# Patient Record
Sex: MALE | Race: WHITE | NOT HISPANIC OR LATINO | Employment: OTHER | URBAN - METROPOLITAN AREA
[De-identification: names, ages, dates, MRNs, and addresses within clinical notes are randomized per-mention and may not be internally consistent; named-entity substitution may affect disease eponyms.]

---

## 2017-02-07 ENCOUNTER — OFFICE VISIT (OUTPATIENT)
Dept: CARDIOLOGY | Facility: CLINIC | Age: 77
End: 2017-02-07

## 2017-02-07 VITALS
SYSTOLIC BLOOD PRESSURE: 142 MMHG | HEART RATE: 70 BPM | DIASTOLIC BLOOD PRESSURE: 80 MMHG | WEIGHT: 182 LBS | BODY MASS INDEX: 27.58 KG/M2 | HEIGHT: 68 IN

## 2017-02-07 DIAGNOSIS — I42.8 NONISCHEMIC CARDIOMYOPATHY (HCC): ICD-10-CM

## 2017-02-07 DIAGNOSIS — I10 ESSENTIAL HYPERTENSION: ICD-10-CM

## 2017-02-07 DIAGNOSIS — I47.1 SVT (SUPRAVENTRICULAR TACHYCARDIA) (HCC): ICD-10-CM

## 2017-02-07 DIAGNOSIS — I25.10 CORONARY ARTERY DISEASE INVOLVING NATIVE CORONARY ARTERY OF NATIVE HEART WITHOUT ANGINA PECTORIS: Primary | ICD-10-CM

## 2017-02-07 DIAGNOSIS — E11.59 TYPE 2 DIABETES MELLITUS WITH OTHER CIRCULATORY COMPLICATION: ICD-10-CM

## 2017-02-07 PROCEDURE — 93000 ELECTROCARDIOGRAM COMPLETE: CPT | Performed by: INTERNAL MEDICINE

## 2017-02-07 PROCEDURE — 99214 OFFICE O/P EST MOD 30 MIN: CPT | Performed by: INTERNAL MEDICINE

## 2017-02-07 RX ORDER — ASPIRIN/CALCIUM/MAG/ALUMINUM 325 MG
325 TABLET ORAL DAILY
Qty: 360 EACH | Refills: 0 | Status: SHIPPED | OUTPATIENT
Start: 2017-02-07 | End: 2017-08-07

## 2017-02-07 RX ORDER — GLIPIZIDE 5 MG/1
5 TABLET ORAL
COMMUNITY
End: 2020-11-05 | Stop reason: DRUGHIGH

## 2017-02-07 NOTE — PROGRESS NOTES
Date of Office Visit: 2017  Encounter Provider: Lori Cruz MD  Place of Service: [facility] CARDIOLOGY  Patient Name: Gómez Franco  :1940    Chief complaint  Followup coronary artery disease and cardiomyopathy      History of Present Illness  The patient is a delightful, 76-year-old gentleman with diabetes, hypertension, and hyperlipidemia who was seen in 2015 with supraventricular tachycardia for which he initially underwent ablation, which was thought to be successful.  At that time, his ejection fraction was 35%.  However, he had recurrent palpitations and tachycardia with flutter for which he was placed on Xarelto and subsequently underwent ablation in 2015.  He then again had recurrent flutter and had a third ablation in 2015.  He subsequently was sent home and has done well and has not had any recurrent known atrial arrhythmias.  He remains on Xarelto and metoprolol.  He also in 2015 had cardiac catheterization that revealed modest coronary artery disease with 50% stenosis of the left anterior descending and a small obtuse marginal branch.  His last echocardiogram in 2015 revealed an ejection fraction of 35% and small patent foramen ovale.  He had a follow-up echocardiogram in 2016 that revealed an ejection fraction of 43% with trivial valvular regurgitation.    Since last visit he denies any chest pain, shortness of breath, palpitations, syncope.  He has unfortunately on his own been taking Xarelto alternating with aspirin every other day as he was running short on Xarelto pills.  He did not call our office for instructions.    Past Medical History   Diagnosis Date   • Atrial flutter    • Coronary artery disease    • Diabetes mellitus    • Hyperlipidemia    • Hypertension    • Supraventricular tachycardia    • Tachycardia      Past Surgical History   Procedure Laterality Date   • Atrial ablation surgery     • Cardiac ablation       atrial  flutter     Outpatient Medications Prior to Visit   Medication Sig Dispense Refill   • amLODIPine (NORVASC) 2.5 MG tablet Take 1 tablet (2.5 mg total) by mouth 2 (two) times a day. 180 tablet 3   • hydrochlorothiazide (HYDRODIURIL) 12.5 MG tablet TAKE ONE TABLET BY MOUTH DAILY 90 tablet 1   • lisinopril (PRINIVIL,ZESTRIL) 20 MG tablet TAKE 1 TABLET TWICE DAILY 180 tablet 1   • metoprolol tartrate (LOPRESSOR) 50 MG tablet Take 1 tablet by mouth 2 (two) times a day. 180 tablet 1   • simvastatin (ZOCOR) 10 MG tablet Take 1 tablet by mouth daily.     • rivaroxaban (XARELTO) 20 MG tablet Take 1 tablet by mouth daily with dinner. 90 tablet 1   • glyBURIDE (DIABETA) 5 MG tablet Take 1 tablet by mouth 2 (two) times a day before meals.       No facility-administered medications prior to visit.      Allergies as of 02/07/2017   • (No Known Allergies)     Social History     Social History   • Marital status:      Spouse name: N/A   • Number of children: N/A   • Years of education: N/A     Occupational History   • Not on file.     Social History Main Topics   • Smoking status: Never Smoker   • Smokeless tobacco: Never Used   • Alcohol use No   • Drug use: No   • Sexual activity: Not on file     Other Topics Concern   • Not on file     Social History Narrative     Family History   Problem Relation Age of Onset   • Hypertension Mother    • Diabetes Other      Unspecified Aunt     Review of Systems   Constitution: Negative for fever, malaise/fatigue, weight gain and weight loss.   HENT: Negative for ear pain, hearing loss, nosebleeds and sore throat.    Eyes: Negative for double vision, pain, vision loss in left eye and vision loss in right eye.   Cardiovascular:        See history of present illness.   Respiratory: Negative for cough, shortness of breath, sleep disturbances due to breathing, snoring and wheezing.    Endocrine: Negative for cold intolerance, heat intolerance and polyuria.   Skin: Negative for itching, poor  "wound healing and rash.   Musculoskeletal: Negative for joint pain, joint swelling and myalgias.   Gastrointestinal: Negative for abdominal pain, diarrhea, hematochezia, nausea and vomiting.   Genitourinary: Negative for hematuria and hesitancy.   Neurological: Negative for numbness, paresthesias and seizures.   Psychiatric/Behavioral: Negative for depression. The patient is not nervous/anxious.      Objective:     Vitals:    02/07/17 0918   BP: 142/80   Pulse: 70   Weight: 182 lb (82.6 kg)   Height: 68\" (172.7 cm)     Body mass index is 27.67 kg/(m^2).    Physical Exam   Constitutional: He is oriented to person, place, and time. He appears well-developed and well-nourished.   HENT:   Head: Normocephalic.   Nose: Nose normal.   Mouth/Throat: Oropharynx is clear and moist.   Eyes: Conjunctivae and EOM are normal. Pupils are equal, round, and reactive to light. Right eye exhibits no discharge. No scleral icterus.   Neck: Normal range of motion. Neck supple. No JVD present. No thyromegaly present.   Cardiovascular: Normal rate, regular rhythm, normal heart sounds and intact distal pulses.  Exam reveals no gallop and no friction rub.    No murmur heard.  Pulses:       Carotid pulses are 2+ on the right side, and 2+ on the left side.       Radial pulses are 2+ on the right side, and 2+ on the left side.        Femoral pulses are 2+ on the right side, and 2+ on the left side.       Popliteal pulses are 2+ on the right side, and 2+ on the left side.        Dorsalis pedis pulses are 2+ on the right side, and 2+ on the left side.        Posterior tibial pulses are 2+ on the right side, and 2+ on the left side.   Pulmonary/Chest: Effort normal and breath sounds normal. No respiratory distress. He has no wheezes. He has no rales.   Abdominal: Soft. Bowel sounds are normal. He exhibits no distension. There is no hepatosplenomegaly. There is no tenderness. There is no rebound.   Musculoskeletal: Normal range of motion. He " exhibits no edema or tenderness.   Neurological: He is alert and oriented to person, place, and time.   Skin: Skin is warm and dry. No rash noted. No erythema.   Psychiatric: He has a normal mood and affect. His behavior is normal. Judgment and thought content normal.   Vitals reviewed.    Lab Review:     ECG 12 Lead  Date/Time: 2/7/2017 10:55 PM  Performed by: MARTHA BANUELOS  Authorized by: MARTHA BANUELOS   Comparison: compared with previous ECG   Similar to previous ECG  Rhythm: sinus rhythm  Conduction: non-specific intraventricular conduction delay  Conduction comments: QTc =415 msec  Clinical impression: abnormal ECG          Assessment:       Diagnosis Plan   1. Coronary artery disease involving native coronary artery of native heart without angina pectoris  Adult Transthoracic Echo Complete    ECG 12 Lead   2. SVT (supraventricular tachycardia)  Adult Transthoracic Echo Complete    ECG 12 Lead   3. Essential hypertension  ECG 12 Lead   4. Nonischemic cardiomyopathy  Adult Transthoracic Echo Complete    ECG 12 Lead   5. Type 2 diabetes mellitus with other circulatory complication  ECG 12 Lead     Plan:       1.  Modest coronary artery disease, clinically stable. Resume asprin once Xarelto stopped.  We'll check a treadmill exercise test in 6 months.  2.  Cardiomyopathy, we'll check a follow-up echocardiogram.  3.  Chronic Xarelto therapy.  He has a few more tablets of Xarelto pit which she will complete and then discontinue.  4.  WPW, paroxysmal supra-ventricular tachycardia and atrial flutter, status post ablation x  3  5.  Diabetes  6.  Dyslipidemia  7.  Hypertension     Gómez Franco   Home Medication Instructions MARIANA:    Printed on:02/08/17 2007   Medication Information                      amLODIPine (NORVASC) 2.5 MG tablet  Take 1 tablet (2.5 mg total) by mouth 2 (two) times a day.             Aspirin Buf,[id number], (ASCRIPTIN) 325 MG tablet  Take 325 mg by mouth Daily.             glipiZIDE  (GLUCOTROL) 5 MG tablet  Take 5 mg by mouth 2 (Two) Times a Day Before Meals.             hydrochlorothiazide (HYDRODIURIL) 12.5 MG tablet  TAKE ONE TABLET BY MOUTH DAILY             lisinopril (PRINIVIL,ZESTRIL) 20 MG tablet  TAKE 1 TABLET TWICE DAILY             metoprolol tartrate (LOPRESSOR) 50 MG tablet  Take 1 tablet by mouth 2 (two) times a day.             simvastatin (ZOCOR) 10 MG tablet  Take 1 tablet by mouth daily.                 EMR Dragon/Transcription disclaimer:   Much of this encounter note is an electronic transcription/translation of spoken language to printed text. The electronic translation of spoken language may permit erroneous, or at times, nonsensical words or phrases to be inadvertently transcribed; Although I have reviewed the note for such errors, some may still exist.

## 2017-02-09 ENCOUNTER — HOSPITAL ENCOUNTER (OUTPATIENT)
Dept: CARDIOLOGY | Facility: HOSPITAL | Age: 77
Discharge: HOME | End: 2017-02-09
Attending: INTERNAL MEDICINE | Admitting: INTERNAL MEDICINE

## 2017-02-09 VITALS
BODY MASS INDEX: 27.58 KG/M2 | WEIGHT: 182 LBS | DIASTOLIC BLOOD PRESSURE: 76 MMHG | SYSTOLIC BLOOD PRESSURE: 128 MMHG | HEIGHT: 68 IN | HEART RATE: 72 BPM

## 2017-02-09 DIAGNOSIS — I25.10 CORONARY ARTERY DISEASE INVOLVING NATIVE CORONARY ARTERY OF NATIVE HEART WITHOUT ANGINA PECTORIS: ICD-10-CM

## 2017-02-09 DIAGNOSIS — I47.1 SVT (SUPRAVENTRICULAR TACHYCARDIA) (HCC): ICD-10-CM

## 2017-02-09 DIAGNOSIS — I42.8 NONISCHEMIC CARDIOMYOPATHY (HCC): ICD-10-CM

## 2017-02-09 LAB
ASCENDING AORTA: 3 CM
BH CV ECHO MEAS - ACS: 1.8 CM
BH CV ECHO MEAS - AO MAX PG (FULL): 3 MMHG
BH CV ECHO MEAS - AO MAX PG: 6.8 MMHG
BH CV ECHO MEAS - AO MEAN PG (FULL): 1.6 MMHG
BH CV ECHO MEAS - AO MEAN PG: 3.2 MMHG
BH CV ECHO MEAS - AO ROOT AREA (BSA CORRECTED): 1.8
BH CV ECHO MEAS - AO ROOT AREA: 9.5 CM^2
BH CV ECHO MEAS - AO ROOT DIAM: 3.5 CM
BH CV ECHO MEAS - AO V2 MAX: 130.3 CM/SEC
BH CV ECHO MEAS - AO V2 MEAN: 80.4 CM/SEC
BH CV ECHO MEAS - AO V2 VTI: 30.6 CM
BH CV ECHO MEAS - ASC AORTA: 3 CM
BH CV ECHO MEAS - AVA(I,A): 2.1 CM^2
BH CV ECHO MEAS - AVA(I,D): 2.1 CM^2
BH CV ECHO MEAS - AVA(V,A): 2.5 CM^2
BH CV ECHO MEAS - AVA(V,D): 2.5 CM^2
BH CV ECHO MEAS - BSA(HAYCOCK): 2 M^2
BH CV ECHO MEAS - BSA: 2 M^2
BH CV ECHO MEAS - BZI_BMI: 27.7 KILOGRAMS/M^2
BH CV ECHO MEAS - BZI_METRIC_HEIGHT: 172.7 CM
BH CV ECHO MEAS - BZI_METRIC_WEIGHT: 82.6 KG
BH CV ECHO MEAS - CONTRAST EF (2CH): 65.1 ML/M^2
BH CV ECHO MEAS - CONTRAST EF 4CH: 63.6 ML/M^2
BH CV ECHO MEAS - EDV(MOD-SP2): 86 ML
BH CV ECHO MEAS - EDV(MOD-SP4): 88 ML
BH CV ECHO MEAS - EDV(TEICH): 147 ML
BH CV ECHO MEAS - EF(CUBED): 86.1 %
BH CV ECHO MEAS - EF(MOD-SP2): 65.1 %
BH CV ECHO MEAS - EF(MOD-SP4): 63.6 %
BH CV ECHO MEAS - EF(TEICH): 79.1 %
BH CV ECHO MEAS - ESV(MOD-SP2): 30 ML
BH CV ECHO MEAS - ESV(MOD-SP4): 32 ML
BH CV ECHO MEAS - ESV(TEICH): 30.7 ML
BH CV ECHO MEAS - FS: 48.3 %
BH CV ECHO MEAS - IVS/LVPW: 1
BH CV ECHO MEAS - IVSD: 1.1 CM
BH CV ECHO MEAS - LAT PEAK E' VEL: 8 CM/SEC
BH CV ECHO MEAS - LV DIASTOLIC VOL/BSA (35-75): 44.8 ML/M^2
BH CV ECHO MEAS - LV MASS(C)D: 234.3 GRAMS
BH CV ECHO MEAS - LV MASS(C)DI: 119.3 GRAMS/M^2
BH CV ECHO MEAS - LV MAX PG: 3.8 MMHG
BH CV ECHO MEAS - LV MEAN PG: 1.6 MMHG
BH CV ECHO MEAS - LV SYSTOLIC VOL/BSA (12-30): 16.3 ML/M^2
BH CV ECHO MEAS - LV V1 MAX: 97.9 CM/SEC
BH CV ECHO MEAS - LV V1 MEAN: 56.1 CM/SEC
BH CV ECHO MEAS - LV V1 VTI: 19.5 CM
BH CV ECHO MEAS - LVIDD: 5.5 CM
BH CV ECHO MEAS - LVIDS: 2.8 CM
BH CV ECHO MEAS - LVLD AP2: 7.4 CM
BH CV ECHO MEAS - LVLD AP4: 7.5 CM
BH CV ECHO MEAS - LVLS AP2: 6.4 CM
BH CV ECHO MEAS - LVLS AP4: 6.3 CM
BH CV ECHO MEAS - LVOT AREA (M): 3.5 CM^2
BH CV ECHO MEAS - LVOT AREA: 3.3 CM^2
BH CV ECHO MEAS - LVOT DIAM: 2.1 CM
BH CV ECHO MEAS - LVPWD: 1.1 CM
BH CV ECHO MEAS - MED PEAK E' VEL: 8 CM/SEC
BH CV ECHO MEAS - MR MAX PG: 74.9 MMHG
BH CV ECHO MEAS - MR MAX VEL: 432.7 CM/SEC
BH CV ECHO MEAS - MV A DUR: 0.11 SEC
BH CV ECHO MEAS - MV A MAX VEL: 85.4 CM/SEC
BH CV ECHO MEAS - MV DEC SLOPE: 298.9 CM/SEC^2
BH CV ECHO MEAS - MV DEC TIME: 0.24 SEC
BH CV ECHO MEAS - MV E MAX VEL: 66 CM/SEC
BH CV ECHO MEAS - MV E/A: 0.77
BH CV ECHO MEAS - MV P1/2T MAX VEL: 65 CM/SEC
BH CV ECHO MEAS - MV P1/2T: 63.7 MSEC
BH CV ECHO MEAS - MVA P1/2T LCG: 3.4 CM^2
BH CV ECHO MEAS - MVA(P1/2T): 3.5 CM^2
BH CV ECHO MEAS - PA MAX PG (FULL): 1.6 MMHG
BH CV ECHO MEAS - PA MAX PG: 3.7 MMHG
BH CV ECHO MEAS - PA V2 MAX: 96.4 CM/SEC
BH CV ECHO MEAS - PULM A REVS DUR: 0.08 SEC
BH CV ECHO MEAS - PULM A REVS VEL: 27.2 CM/SEC
BH CV ECHO MEAS - PULM DIAS VEL: 41.7 CM/SEC
BH CV ECHO MEAS - PULM S/D: 1.5
BH CV ECHO MEAS - PULM SYS VEL: 60.6 CM/SEC
BH CV ECHO MEAS - PVA(V,A): 2.2 CM^2
BH CV ECHO MEAS - PVA(V,D): 2.2 CM^2
BH CV ECHO MEAS - QP/QS: 0.7
BH CV ECHO MEAS - RAP SYSTOLE: 3 MMHG
BH CV ECHO MEAS - RV MAX PG: 2.1 MMHG
BH CV ECHO MEAS - RV MEAN PG: 1.1 MMHG
BH CV ECHO MEAS - RV V1 MAX: 73.2 CM/SEC
BH CV ECHO MEAS - RV V1 MEAN: 49.3 CM/SEC
BH CV ECHO MEAS - RV V1 VTI: 15.8 CM
BH CV ECHO MEAS - RVOT AREA: 2.9 CM^2
BH CV ECHO MEAS - RVOT DIAM: 1.9 CM
BH CV ECHO MEAS - SI(AO): 148.1 ML/M^2
BH CV ECHO MEAS - SI(CUBED): 72.7 ML/M^2
BH CV ECHO MEAS - SI(LVOT): 33.1 ML/M^2
BH CV ECHO MEAS - SI(MOD-SP2): 28.5 ML/M^2
BH CV ECHO MEAS - SI(MOD-SP4): 28.5 ML/M^2
BH CV ECHO MEAS - SI(TEICH): 59.2 ML/M^2
BH CV ECHO MEAS - SUP REN AO DIAM: 1.9 CM
BH CV ECHO MEAS - SV(AO): 290.8 ML
BH CV ECHO MEAS - SV(CUBED): 142.8 ML
BH CV ECHO MEAS - SV(LVOT): 65 ML
BH CV ECHO MEAS - SV(MOD-SP2): 56 ML
BH CV ECHO MEAS - SV(MOD-SP4): 56 ML
BH CV ECHO MEAS - SV(RVOT): 45.5 ML
BH CV ECHO MEAS - SV(TEICH): 116.3 ML
BH CV ECHO MEAS - TAPSE (>1.6): 2.4 CM2
BH CV XLRA - RV BASE: 2.9 CM
BH CV XLRA - TDI S': 12 CM/SEC
E/E' RATIO: 8
LEFT ATRIUM VOLUME INDEX: 19 ML/M2
LV EF 2D ECHO EST: 64 %
SINUS: 3.4 CM
STJ: 2.9 CM

## 2017-02-09 PROCEDURE — 93306 TTE W/DOPPLER COMPLETE: CPT

## 2017-02-09 PROCEDURE — 93306 TTE W/DOPPLER COMPLETE: CPT | Performed by: INTERNAL MEDICINE

## 2017-02-13 ENCOUNTER — TELEPHONE (OUTPATIENT)
Dept: CARDIOLOGY | Facility: CLINIC | Age: 77
End: 2017-02-13

## 2017-02-17 RX ORDER — LISINOPRIL 20 MG/1
20 TABLET ORAL 2 TIMES DAILY
Qty: 180 TABLET | Refills: 1 | Status: SHIPPED | OUTPATIENT
Start: 2017-02-17 | End: 2017-08-25 | Stop reason: SDUPTHER

## 2017-02-17 RX ORDER — LISINOPRIL 20 MG/1
20 TABLET ORAL 2 TIMES DAILY
Qty: 180 TABLET | Refills: 1 | Status: SHIPPED | OUTPATIENT
Start: 2017-02-17 | End: 2017-02-17 | Stop reason: SDUPTHER

## 2017-02-17 RX ORDER — LISINOPRIL 20 MG/1
20 TABLET ORAL 2 TIMES DAILY
Qty: 60 TABLET | Refills: 1 | Status: SHIPPED | OUTPATIENT
Start: 2017-02-17 | End: 2017-02-17 | Stop reason: SDUPTHER

## 2017-02-21 RX ORDER — METOPROLOL TARTRATE 50 MG/1
50 TABLET, FILM COATED ORAL 2 TIMES DAILY
Qty: 180 TABLET | Refills: 1 | Status: SHIPPED | OUTPATIENT
Start: 2017-02-21 | End: 2017-02-24 | Stop reason: SDUPTHER

## 2017-02-21 RX ORDER — AMLODIPINE BESYLATE 2.5 MG/1
2.5 TABLET ORAL 2 TIMES DAILY
Qty: 180 TABLET | Refills: 1 | Status: SHIPPED | OUTPATIENT
Start: 2017-02-21 | End: 2017-02-24 | Stop reason: SDUPTHER

## 2017-02-21 RX ORDER — HYDROCHLOROTHIAZIDE 12.5 MG/1
12.5 TABLET ORAL DAILY
Qty: 90 TABLET | Refills: 1 | Status: SHIPPED | OUTPATIENT
Start: 2017-02-21 | End: 2017-02-24 | Stop reason: SDUPTHER

## 2017-02-24 RX ORDER — METOPROLOL TARTRATE 50 MG/1
50 TABLET, FILM COATED ORAL 2 TIMES DAILY
Qty: 180 TABLET | Refills: 1 | Status: SHIPPED | OUTPATIENT
Start: 2017-02-24 | End: 2017-08-25 | Stop reason: SDUPTHER

## 2017-02-24 RX ORDER — HYDROCHLOROTHIAZIDE 12.5 MG/1
12.5 TABLET ORAL DAILY
Qty: 90 TABLET | Refills: 1 | Status: SHIPPED | OUTPATIENT
Start: 2017-02-24 | End: 2017-08-25 | Stop reason: SDUPTHER

## 2017-02-24 RX ORDER — AMLODIPINE BESYLATE 2.5 MG/1
2.5 TABLET ORAL 2 TIMES DAILY
Qty: 180 TABLET | Refills: 1 | Status: SHIPPED | OUTPATIENT
Start: 2017-02-24 | End: 2017-08-25 | Stop reason: SDUPTHER

## 2017-05-30 RX ORDER — LISINOPRIL 20 MG/1
TABLET ORAL
Qty: 60 TABLET | Refills: 3 | Status: SHIPPED | OUTPATIENT
Start: 2017-05-30 | End: 2017-08-07 | Stop reason: SDUPTHER

## 2017-06-15 ENCOUNTER — OFFICE VISIT (OUTPATIENT)
Dept: ORTHOPEDIC SURGERY | Facility: CLINIC | Age: 77
End: 2017-06-15

## 2017-06-15 VITALS
HEIGHT: 68 IN | HEART RATE: 67 BPM | WEIGHT: 165 LBS | BODY MASS INDEX: 25.01 KG/M2 | SYSTOLIC BLOOD PRESSURE: 173 MMHG | DIASTOLIC BLOOD PRESSURE: 91 MMHG

## 2017-06-15 DIAGNOSIS — M67.912 TENDINOPATHY OF ROTATOR CUFF, LEFT: ICD-10-CM

## 2017-06-15 DIAGNOSIS — M19.019 AC JOINT ARTHROPATHY: ICD-10-CM

## 2017-06-15 DIAGNOSIS — R52 PAIN: Primary | ICD-10-CM

## 2017-06-15 PROBLEM — M67.919 TENDINOPATHY OF ROTATOR CUFF: Status: ACTIVE | Noted: 2017-06-15

## 2017-06-15 PROCEDURE — 20610 DRAIN/INJ JOINT/BURSA W/O US: CPT | Performed by: NURSE PRACTITIONER

## 2017-06-15 PROCEDURE — 99203 OFFICE O/P NEW LOW 30 MIN: CPT | Performed by: NURSE PRACTITIONER

## 2017-06-15 PROCEDURE — 73030 X-RAY EXAM OF SHOULDER: CPT | Performed by: NURSE PRACTITIONER

## 2017-06-15 RX ORDER — LIDOCAINE HYDROCHLORIDE 10 MG/ML
2 INJECTION, SOLUTION EPIDURAL; INFILTRATION; INTRACAUDAL; PERINEURAL
Status: COMPLETED | OUTPATIENT
Start: 2017-06-15 | End: 2017-06-15

## 2017-06-15 RX ORDER — ASPIRIN 81 MG/1
81 TABLET ORAL DAILY
COMMUNITY
End: 2019-02-12

## 2017-06-15 RX ORDER — BETAMETHASONE SODIUM PHOSPHATE AND BETAMETHASONE ACETATE 3; 3 MG/ML; MG/ML
12 INJECTION, SUSPENSION INTRA-ARTICULAR; INTRALESIONAL; INTRAMUSCULAR; SOFT TISSUE
Status: COMPLETED | OUTPATIENT
Start: 2017-06-15 | End: 2017-06-15

## 2017-06-15 RX ORDER — BUPIVACAINE HYDROCHLORIDE 5 MG/ML
2 INJECTION, SOLUTION EPIDURAL; INTRACAUDAL
Status: COMPLETED | OUTPATIENT
Start: 2017-06-15 | End: 2017-06-15

## 2017-06-15 RX ADMIN — LIDOCAINE HYDROCHLORIDE 2 ML: 10 INJECTION, SOLUTION EPIDURAL; INFILTRATION; INTRACAUDAL; PERINEURAL at 09:20

## 2017-06-15 RX ADMIN — BUPIVACAINE HYDROCHLORIDE 2 ML: 5 INJECTION, SOLUTION EPIDURAL; INTRACAUDAL at 09:20

## 2017-06-15 RX ADMIN — BETAMETHASONE SODIUM PHOSPHATE AND BETAMETHASONE ACETATE 12 MG: 3; 3 INJECTION, SUSPENSION INTRA-ARTICULAR; INTRALESIONAL; INTRAMUSCULAR; SOFT TISSUE at 09:20

## 2017-06-15 NOTE — PROGRESS NOTES
Subjective:     Patient ID: Gómez Franco is a 76 y.o. male.    Chief Complaint: Left shoulder pain, one month after injury    History of Present Illness    Mr. Franco presents with a reported one month history of acute onset left shoulder pain. Began noticing pain after falling, backward, off a short stool. He reached back to catch himself and afterward noticed increased pain lateral aspect left shoulder. Reports left upper extremity feeling weak when in dependent position. Increased pain when reaching backward and is unable to dress nor reaching back to get wallet out of pocket due to severe pain. Rates pain at 8-9 out of 10, aching and pulling in nature. Denies radiating pain. Denies previous injury, surgery, corticosteroid injections, imaging and all other treatments at left shoulder. Denies presence of numbness or tingling. He has been avoiding activities that increase pain which has helped. He does take daily  mg therefore has not been taking any other medications for symptom relief. Denies all other concerns present at this time.      Social History     Occupational History   • Not on file.     Social History Main Topics   • Smoking status: Never Smoker   • Smokeless tobacco: Never Used   • Alcohol use No   • Drug use: No   • Sexual activity: Not on file      Past Medical History:   Diagnosis Date   • Atrial flutter    • Coronary artery disease    • Diabetes mellitus    • Hyperlipidemia    • Hypertension    • Supraventricular tachycardia    • Tachycardia      Past Surgical History:   Procedure Laterality Date   • ATRIAL ABLATION SURGERY     • CARDIAC ABLATION      atrial flutter       Family History   Problem Relation Age of Onset   • Hypertension Mother    • Diabetes Other      Unspecified Aunt         Review of Systems   Constitutional: Negative for chills, diaphoresis, fever and unexpected weight change.   HENT: Negative for hearing loss, nosebleeds, sore throat and tinnitus.    Eyes:  "Negative for pain and visual disturbance.   Respiratory: Negative for cough, shortness of breath and wheezing.    Cardiovascular: Negative for chest pain and palpitations.   Gastrointestinal: Negative for abdominal pain, diarrhea, nausea and vomiting.   Endocrine: Negative for cold intolerance, heat intolerance and polydipsia.   Genitourinary: Negative for difficulty urinating, dysuria and hematuria.   Musculoskeletal: Positive for arthralgias. Negative for joint swelling and myalgias.   Skin: Negative for rash and wound.   Allergic/Immunologic: Negative for environmental allergies.   Neurological: Negative for dizziness, syncope and numbness.   Hematological: Does not bruise/bleed easily.   Psychiatric/Behavioral: Negative for dysphoric mood and sleep disturbance. The patient is not nervous/anxious.            Objective:  Physical Exam    Vital signs reviewed.   General: No acute distress.  Eyes: conjunctiva clear; pupils equally round and reactive  ENT: external ears and nose atraumatic; oropharynx clear  CV: no peripheral edema  Resp: normal respiratory effort  Skin: no rashes or wounds; normal turgor  Psych: mood and affect appropriate; recent and remote memory intact    Vitals:    06/15/17 0844   BP: 173/91   Pulse: 67   Weight: 165 lb (74.8 kg)   Height: 68\" (172.7 cm)     Last 2 weights    06/15/17  0844   Weight: 165 lb (74.8 kg)     Body mass index is 25.09 kg/(m^2).     Left Shoulder Exam     Tenderness   The patient is experiencing tenderness in the acromion.    Range of Motion   Forward Flexion: 180+   External Rotation: 70     Muscle Strength   Internal Rotation: 4/5   External Rotation: 4/5   Supraspinatus: 4/5   Subscapularis: 4/5   Biceps: 4/5     Tests   Apprehension: positive  Cross Arm: positive  Drop Arm: negative  Hawkin's test: negative  Impingement: negative  Sulcus: absent    Other   Erythema: absent  Scars: absent  Sensation: normal  Pulse: present     Comments:  Positive bear hug  Negative " empty can  Negative [place]'s  Positive belly press  Negative Speed's   Internal rotation to side          Imaging:  Left Shoulder X-Ray  Indication: Pain  AP Internal and External Rotation views    Findings:  No fracture  No bony lesion  Normal soft tissues  AC joint arthropathy     No prior studies were available for comparison.    Assessment:       1. Pain    2. Tendinopathy of rotator cuff, left    3. AC joint arthropathy          Plan:  1. Discussed plan of care with patient. Wishes to proceed with corticosteroid injection left shoulder.   2. Provided him with home strengthening activities to complete along with stretching activities. Refuses referral to physical therapy at this time. Will plan to see him back in four weeks to reassess.   BEULAH query complete.  Large Joint Arthrocentesis  Date/Time: 6/15/2017 9:20 AM  Consent given by: patient  Site marked: site marked  Supporting Documentation  Indications: pain   Procedure Details  Location: shoulder - L subacromial bursa  Preparation: Patient was prepped and draped in the usual sterile fashion  Needle size: 22 G  Medications administered: 2 mL lidocaine PF 1% 1 %; 2 mL bupivacaine (PF) 0.5 %; 12 mg betamethasone acetate-betamethasone sodium phosphate 6 (3-3) MG/ML  Patient tolerance: patient tolerated the procedure well with no immediate complications

## 2017-07-12 ENCOUNTER — OFFICE VISIT (OUTPATIENT)
Dept: ORTHOPEDIC SURGERY | Facility: CLINIC | Age: 77
End: 2017-07-12

## 2017-07-12 DIAGNOSIS — M67.912 TENDINOPATHY OF ROTATOR CUFF, LEFT: Primary | ICD-10-CM

## 2017-07-12 DIAGNOSIS — M19.019 AC JOINT ARTHROPATHY: ICD-10-CM

## 2017-07-12 PROCEDURE — 99212 OFFICE O/P EST SF 10 MIN: CPT | Performed by: NURSE PRACTITIONER

## 2017-07-12 NOTE — PROGRESS NOTES
Subjective:     Patient ID: Gómez Franco is a 76 y.o. male.    Chief Complaint: follow-up rotator cuff tendinopathy, left shoulder pain    History of Present Illness    Mr. Franco presents for four week follow-up left shoulder pain. Reports 90% symptom relief with use and 100% relief of symptoms at rest. He occasionally experiences pain with reaching back and out to side however is very pleased with the relief he has received. Denies all other concerns present at this time.      Social History     Occupational History   • Not on file.     Social History Main Topics   • Smoking status: Never Smoker   • Smokeless tobacco: Never Used   • Alcohol use No   • Drug use: No   • Sexual activity: Not on file      Past Medical History:   Diagnosis Date   • Atrial flutter    • Coronary artery disease    • Diabetes mellitus    • Hyperlipidemia    • Hypertension    • Supraventricular tachycardia    • Tachycardia      Past Surgical History:   Procedure Laterality Date   • ATRIAL ABLATION SURGERY     • CARDIAC ABLATION      atrial flutter       Family History   Problem Relation Age of Onset   • Hypertension Mother    • Diabetes Other      Unspecified Aunt         Review of Systems   Constitutional: Negative for chills, diaphoresis, fever and unexpected weight change.   HENT: Negative for hearing loss, nosebleeds, sore throat and tinnitus.    Eyes: Negative for pain and visual disturbance.   Respiratory: Negative for cough, shortness of breath and wheezing.    Cardiovascular: Negative for chest pain and palpitations.   Gastrointestinal: Negative for abdominal pain, diarrhea, nausea and vomiting.   Endocrine: Negative for cold intolerance, heat intolerance and polydipsia.   Genitourinary: Negative for difficulty urinating, dysuria and hematuria.   Musculoskeletal: Positive for arthralgias. Negative for joint swelling and myalgias.   Skin: Negative for rash and wound.   Allergic/Immunologic: Negative for environmental  allergies.   Neurological: Negative for dizziness, syncope and numbness.   Hematological: Does not bruise/bleed easily.   Psychiatric/Behavioral: Negative for dysphoric mood and sleep disturbance. The patient is not nervous/anxious.    All other systems reviewed and are negative.          Objective:  Physical Exam    General: No acute distress.  Eyes: conjunctiva clear; pupils equally round and reactive  ENT: external ears and nose atraumatic; oropharynx clear  CV: no peripheral edema  Resp: normal respiratory effort  Skin: no rashes or wounds; normal turgor  Psych: mood and affect appropriate; recent and remote memory intact    There were no vitals filed for this visit.  There were no vitals filed for this visit.  There is no height or weight on file to calculate BMI.     Ortho Exam      Left Shoulder Exam      Tenderness   The patient is experiencing tenderness in the acromion.     Range of Motion   Forward Flexion: 180+   External Rotation: 70      Muscle Strength   Internal Rotation: 4/5   External Rotation: 4/5   Supraspinatus: 4/5   Subscapularis: 4/5   Biceps: 4/5      Tests   Apprehension: positive  Cross Arm: positive  Drop Arm: negative  Hawkin's test: negative  Impingement: negative  Sulcus: absent     Other   Erythema: absent  Scars: absent  Sensation: normal  Pulse: present      Comments:   Positive bear hug  Negative empty can  Negative [place]'s  Positive belly press  Negative Speed's   Internal rotation to side    Assessment:       1. Tendinopathy of rotator cuff, left    2. AC joint arthropathy          Plan:  1. Discussed plan of care with patient. Will plan to follow-up in 3-4 months as needed. Patient encouraged to continue with home strengthening activities that were previously discussed at his first visit. Patient verbalized understanding of all information and agrees with plan of care. Denies all other concerns present at this time.

## 2017-08-07 ENCOUNTER — OFFICE VISIT (OUTPATIENT)
Dept: CARDIOLOGY | Facility: CLINIC | Age: 77
End: 2017-08-07

## 2017-08-07 VITALS
DIASTOLIC BLOOD PRESSURE: 80 MMHG | WEIGHT: 171 LBS | BODY MASS INDEX: 25.91 KG/M2 | HEART RATE: 63 BPM | HEIGHT: 68 IN | SYSTOLIC BLOOD PRESSURE: 122 MMHG

## 2017-08-07 DIAGNOSIS — I25.10 CORONARY ARTERY DISEASE INVOLVING NATIVE CORONARY ARTERY OF NATIVE HEART WITHOUT ANGINA PECTORIS: Primary | ICD-10-CM

## 2017-08-07 DIAGNOSIS — I42.8 NONISCHEMIC CARDIOMYOPATHY (HCC): ICD-10-CM

## 2017-08-07 DIAGNOSIS — E11.59 TYPE 2 DIABETES MELLITUS WITH OTHER CIRCULATORY COMPLICATION: ICD-10-CM

## 2017-08-07 DIAGNOSIS — I10 ESSENTIAL HYPERTENSION: ICD-10-CM

## 2017-08-07 DIAGNOSIS — I47.1 SVT (SUPRAVENTRICULAR TACHYCARDIA) (HCC): ICD-10-CM

## 2017-08-07 PROCEDURE — 99213 OFFICE O/P EST LOW 20 MIN: CPT | Performed by: INTERNAL MEDICINE

## 2017-08-07 PROCEDURE — 93000 ELECTROCARDIOGRAM COMPLETE: CPT | Performed by: INTERNAL MEDICINE

## 2017-08-07 NOTE — PROGRESS NOTES
Date of Office Visit: 2017  Encounter Provider: Lori Cruz MD  Place of Service: [facility] CARDIOLOGY  Patient Name: Gómez Franco  :1940    Chief complaint  Followup coronary artery disease and transient cardiomyopathy    History of Present Illness  The patient is a delightful, 76-year-old gentleman with diabetes, hypertension, and hyperlipidemia who was seen in 2015 with supraventricular tachycardia for which he initially underwent ablation, which was thought to be successful.  At that time, his ejection fraction was 35%.  However, he had recurrent palpitations and tachycardia with flutter for which he was placed on Xarelto and subsequently underwent ablation in 2015.  He then again had recurrent flutter and had a third ablation in 2015.  He subsequently was sent home and has done well and has not had any recurrent known atrial arrhythmias.  He remains on Xarelto and metoprolol.  He also in 2015 had cardiac catheterization that revealed modest coronary artery disease with 50% stenosis of the left anterior descending and a small obtuse marginal branch.  His last echocardiogram in 2015 revealed an ejection fraction of 35% and small patent foramen ovale.  He had a follow-up echocardiogram on 2017 that showed normal systolic function with an ejection fraction of 64%.  No significant valvular heart disease was present.    Since last visit he has remained very active he denies any chest pain, shortness of breath, palpitations, syncope near syncope.  His blood pressures at home and checked early in the morning have been in the 150s over 89 range.  He has not been checking it during the day though he remains very active.  He admits to having significant dietary discretion's with sugar in take and his hemoglobin A1c had almost doubled.  He has made significant lifestyle changes since then and is to have additional blood work in November with   [facility].    Past Medical History:   Diagnosis Date   • Atrial flutter    • Chest pain    • Coronary artery disease    • Diabetes mellitus    • Fatigue    • Hyperlipidemia    • Hypertension    • Lightheadedness    • SOB (shortness of breath)    • Supraventricular tachycardia    • Tachycardia      Past Surgical History:   Procedure Laterality Date   • ATRIAL ABLATION SURGERY     • CARDIAC ABLATION      atrial flutter     Outpatient Medications Prior to Visit   Medication Sig Dispense Refill   • amLODIPine (NORVASC) 2.5 MG tablet Take 1 tablet by mouth 2 (Two) Times a Day. 180 tablet 1   • aspirin 81 MG EC tablet Take 81 mg by mouth Daily.     • glipiZIDE (GLUCOTROL) 5 MG tablet Take 5 mg by mouth 2 (Two) Times a Day Before Meals.     • hydrochlorothiazide (HYDRODIURIL) 12.5 MG tablet Take 1 tablet by mouth Daily. 90 tablet 1   • lisinopril (PRINIVIL,ZESTRIL) 20 MG tablet Take 1 tablet by mouth 2 (Two) Times a Day. 180 tablet 1   • metoprolol tartrate (LOPRESSOR) 50 MG tablet Take 1 tablet by mouth 2 (Two) Times a Day. 180 tablet 1   • simvastatin (ZOCOR) 10 MG tablet Take 1 tablet by mouth daily.     • Aspirin Buf,[id number]-[id number], (ASCRIPTIN) 325 MG tablet Take 325 mg by mouth Daily. 360 each 0   • Diclofenac Sodium (PENNSAID) 2 % solution Place 2 g on the skin 2 (Two) Times a Day. 112 g 3   • lisinopril (PRINIVIL,ZESTRIL) 20 MG tablet TAKE ONE TABLET BY MOUTH TWICE A DAY 60 tablet 3     No facility-administered medications prior to visit.      Allergies as of 08/07/2017   • (No Known Allergies)     Social History     Social History   • Marital status:      Spouse name: N/A   • Number of children: N/A   • Years of education: N/A     Occupational History   • Not on file.     Social History Main Topics   • Smoking status: Never Smoker   • Smokeless tobacco: Never Used   • Alcohol use No   • Drug use: No   • Sexual activity: Not on file     Other Topics Concern   • Not on file     Social History Narrative  "    Family History   Problem Relation Age of Onset   • Hypertension Mother    • Diabetes Other      Unspecified Aunt     Review of Systems   Constitution: Negative for fever, malaise/fatigue, weight gain and weight loss.   HENT: Negative for ear pain, hearing loss, nosebleeds and sore throat.    Eyes: Negative for double vision, pain, vision loss in left eye and vision loss in right eye.   Cardiovascular:        See history of present illness.   Respiratory: Negative for cough, shortness of breath, sleep disturbances due to breathing, snoring and wheezing.    Endocrine: Negative for cold intolerance, heat intolerance and polyuria.   Skin: Negative for itching, poor wound healing and rash.   Musculoskeletal: Positive for myalgias. Negative for joint pain and joint swelling.        Pulled biceps   Gastrointestinal: Negative for abdominal pain, diarrhea, hematochezia, nausea and vomiting.   Genitourinary: Negative for hematuria and hesitancy.   Neurological: Negative for numbness, paresthesias and seizures.   Psychiatric/Behavioral: Negative for depression. The patient is not nervous/anxious.      Objective:     Vitals:    08/07/17 1145   BP: 122/80   Pulse: 63   Weight: 171 lb (77.6 kg)   Height: 68\" (172.7 cm)     Body mass index is 26 kg/(m^2).    Physical Exam   Constitutional: He is oriented to person, place, and time. He appears well-developed and well-nourished.   HENT:   Head: Normocephalic.   Nose: Nose normal.   Mouth/Throat: Oropharynx is clear and moist.   Eyes: Conjunctivae and EOM are normal. Pupils are equal, round, and reactive to light. Right eye exhibits no discharge. No scleral icterus.   Neck: Normal range of motion. Neck supple. No JVD present. No thyromegaly present.   Cardiovascular: Normal rate, regular rhythm, normal heart sounds and intact distal pulses.  Exam reveals no gallop and no friction rub.    No murmur heard.  Pulses:       Carotid pulses are 2+ on the right side, and 2+ on the left " side.       Radial pulses are 2+ on the right side, and 2+ on the left side.        Femoral pulses are 2+ on the right side, and 2+ on the left side.       Popliteal pulses are 2+ on the right side, and 2+ on the left side.        Dorsalis pedis pulses are 2+ on the right side, and 2+ on the left side.        Posterior tibial pulses are 2+ on the right side, and 2+ on the left side.   Pulmonary/Chest: Effort normal and breath sounds normal. No respiratory distress. He has no wheezes. He has no rales.   Abdominal: Soft. Bowel sounds are normal. He exhibits no distension. There is no hepatosplenomegaly. There is no tenderness. There is no rebound.   Musculoskeletal: Normal range of motion. He exhibits no edema or tenderness.   Neurological: He is alert and oriented to person, place, and time.   Skin: Skin is warm and dry. No rash noted. No erythema.   Psychiatric: He has a normal mood and affect. His behavior is normal. Judgment and thought content normal.   Vitals reviewed.    Lab Review:     ECG 12 Lead  Date/Time: 8/7/2017 7:46 AM  Performed by: MARTHA BANUELOS  Authorized by: MARTHA BANUELOS   Comparison: compared with previous ECG   Similar to previous ECG  Rhythm: sinus rhythm  Conduction: non-specific intraventricular conduction delay  Conduction comments: QTc =418 msec  Clinical impression: abnormal ECG          Assessment:       Diagnosis Plan   1. Coronary artery disease involving native coronary artery of native heart without angina pectoris     2. Essential hypertension     3. Nonischemic cardiomyopathy     4. SVT (supraventricular tachycardia)     5. Type 2 diabetes mellitus with other circulatory complication       Plan:       1.  Modest coronary artery disease, clinically stable.  Clinically doing well.  Needs further risk factor modification with diabetic control and possibly with additional pressure control.  2.  Hypertension.  He will call with additional readings over the next week checked later in the day  and in the evening.  3.  Transient cardiomyopathy.  Likely tachycardia mediated.  Resolved  4.  WPW, paroxysmal supra-ventricular tachycardia and atrial flutter, status post ablation x  3  5.  Diabetes  6.  Dyslipidemia  7.  Hypertension    Coronary Artery Disease  Assessment  • The patient has no angina    Plan  • Lifestyle modifications discussed include adhering to a heart healthy diet, maintenance of a healthy weight, regular exercise and regular monitoring of cholesterol and blood pressure    Subjective - Objective  • Current antiplatelet therapy includes aspirin 81 mg           Gómez Franco   Home Medication Instructions MARIANA:    Printed on:08/11/17 7143   Medication Information                      amLODIPine (NORVASC) 2.5 MG tablet  Take 1 tablet by mouth 2 (Two) Times a Day.             aspirin 81 MG EC tablet  Take 81 mg by mouth Daily.             glipiZIDE (GLUCOTROL) 5 MG tablet  Take 5 mg by mouth 2 (Two) Times a Day Before Meals.             hydrochlorothiazide (HYDRODIURIL) 12.5 MG tablet  Take 1 tablet by mouth Daily.             lisinopril (PRINIVIL,ZESTRIL) 20 MG tablet  Take 1 tablet by mouth 2 (Two) Times a Day.             metoprolol tartrate (LOPRESSOR) 50 MG tablet  Take 1 tablet by mouth 2 (Two) Times a Day.             simvastatin (ZOCOR) 10 MG tablet  Take 1 tablet by mouth daily.               Dictated utilizing Dragon dictation

## 2017-08-25 RX ORDER — METOPROLOL TARTRATE 50 MG/1
50 TABLET, FILM COATED ORAL 2 TIMES DAILY
Qty: 180 TABLET | Refills: 1 | Status: SHIPPED | OUTPATIENT
Start: 2017-08-25 | End: 2018-02-28 | Stop reason: SDUPTHER

## 2017-08-25 RX ORDER — AMLODIPINE BESYLATE 2.5 MG/1
2.5 TABLET ORAL 2 TIMES DAILY
Qty: 180 TABLET | Refills: 1 | Status: SHIPPED | OUTPATIENT
Start: 2017-08-25 | End: 2018-02-28 | Stop reason: SDUPTHER

## 2017-08-25 RX ORDER — HYDROCHLOROTHIAZIDE 12.5 MG/1
12.5 TABLET ORAL DAILY
Qty: 90 TABLET | Refills: 1 | Status: SHIPPED | OUTPATIENT
Start: 2017-08-25 | End: 2018-08-07

## 2017-08-25 RX ORDER — LISINOPRIL 20 MG/1
20 TABLET ORAL 2 TIMES DAILY
Qty: 180 TABLET | Refills: 1 | Status: SHIPPED | OUTPATIENT
Start: 2017-08-25 | End: 2018-02-28 | Stop reason: SDUPTHER

## 2017-11-27 RX ORDER — HYDROCHLOROTHIAZIDE 12.5 MG/1
TABLET ORAL
Qty: 90 TABLET | Refills: 3 | Status: SHIPPED | OUTPATIENT
Start: 2017-11-27 | End: 2018-08-07

## 2018-02-28 RX ORDER — AMLODIPINE BESYLATE 2.5 MG/1
2.5 TABLET ORAL 2 TIMES DAILY
Qty: 180 TABLET | Refills: 1 | Status: SHIPPED | OUTPATIENT
Start: 2018-02-28 | End: 2018-08-27 | Stop reason: SDUPTHER

## 2018-02-28 RX ORDER — LISINOPRIL 20 MG/1
20 TABLET ORAL 2 TIMES DAILY
Qty: 180 TABLET | Refills: 1 | Status: SHIPPED | OUTPATIENT
Start: 2018-02-28 | End: 2018-08-27 | Stop reason: SDUPTHER

## 2018-02-28 RX ORDER — METOPROLOL TARTRATE 50 MG/1
50 TABLET, FILM COATED ORAL EVERY 12 HOURS SCHEDULED
Qty: 180 TABLET | Refills: 1 | Status: SHIPPED | OUTPATIENT
Start: 2018-02-28 | End: 2018-02-28 | Stop reason: SDUPTHER

## 2018-02-28 RX ORDER — AMLODIPINE BESYLATE 2.5 MG/1
2.5 TABLET ORAL 2 TIMES DAILY
Qty: 180 TABLET | Refills: 1 | Status: SHIPPED | OUTPATIENT
Start: 2018-02-28 | End: 2018-02-28 | Stop reason: SDUPTHER

## 2018-02-28 RX ORDER — METOPROLOL TARTRATE 50 MG/1
50 TABLET, FILM COATED ORAL EVERY 12 HOURS SCHEDULED
Qty: 180 TABLET | Refills: 1 | Status: SHIPPED | OUTPATIENT
Start: 2018-02-28 | End: 2018-08-27 | Stop reason: SDUPTHER

## 2018-02-28 RX ORDER — LISINOPRIL 20 MG/1
20 TABLET ORAL 2 TIMES DAILY
Qty: 180 TABLET | Refills: 1 | Status: SHIPPED | OUTPATIENT
Start: 2018-02-28 | End: 2018-02-28 | Stop reason: SDUPTHER

## 2018-08-07 ENCOUNTER — OFFICE VISIT (OUTPATIENT)
Dept: CARDIOLOGY | Facility: CLINIC | Age: 78
End: 2018-08-07

## 2018-08-07 VITALS
SYSTOLIC BLOOD PRESSURE: 114 MMHG | BODY MASS INDEX: 26.67 KG/M2 | DIASTOLIC BLOOD PRESSURE: 80 MMHG | HEIGHT: 68 IN | WEIGHT: 176 LBS | HEART RATE: 57 BPM

## 2018-08-07 DIAGNOSIS — I10 ESSENTIAL HYPERTENSION: ICD-10-CM

## 2018-08-07 DIAGNOSIS — E11.59 TYPE 2 DIABETES MELLITUS WITH OTHER CIRCULATORY COMPLICATION, WITHOUT LONG-TERM CURRENT USE OF INSULIN (HCC): ICD-10-CM

## 2018-08-07 DIAGNOSIS — I25.10 CORONARY ARTERY DISEASE INVOLVING NATIVE CORONARY ARTERY OF NATIVE HEART WITHOUT ANGINA PECTORIS: Primary | ICD-10-CM

## 2018-08-07 DIAGNOSIS — I47.1 SVT (SUPRAVENTRICULAR TACHYCARDIA) (HCC): ICD-10-CM

## 2018-08-07 DIAGNOSIS — I42.8 NONISCHEMIC CARDIOMYOPATHY (HCC): ICD-10-CM

## 2018-08-07 PROCEDURE — 93000 ELECTROCARDIOGRAM COMPLETE: CPT | Performed by: INTERNAL MEDICINE

## 2018-08-07 PROCEDURE — 99214 OFFICE O/P EST MOD 30 MIN: CPT | Performed by: INTERNAL MEDICINE

## 2018-08-07 NOTE — PROGRESS NOTES
Date of Office Visit: 2018  Encounter Provider: Lori Cruz MD  Place of Service: [facility] CARDIOLOGY  Patient Name: Gómez Franco  :1940    Chief complaint  Followup coronary artery disease, atrial flutter and transient cardiomyopathy    History of Present Illness  The patient is a delightful, 76-year-old gentleman with diabetes, hypertension, and hyperlipidemia who was seen in 2015 with supraventricular tachycardia for which he initially underwent ablation, which was thought to be successful.  At that time, his ejection fraction was 35%.  However, he had recurrent palpitations and tachycardia with flutter for which he was placed on Xarelto and subsequently underwent ablation in 2015.  He then again had recurrent flutter and had a third ablation in 2015.  He subsequently was sent home and has done well and has not had any recurrent known atrial arrhythmias.  He remains on Xarelto and metoprolol.  He also in 2015 had cardiac catheterization that revealed modest coronary artery disease with 50% stenosis of the left anterior descending and a small obtuse marginal branch.  His last echocardiogram in 2015 revealed an ejection fraction of 35% and small patent foramen ovale.  He had a follow-up echocardiogram on 2017 that showed normal systolic function with an ejection fraction of 64%.  No significant valvular heart disease was present.    Since last visit, he has had no chest pain, shortness of breath, palpitations, syncope, near-syncope. 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 Overall he feels well.    Past Medical History:   Diagnosis Date   • Atrial flutter (CMS/HCC)    • Chest pain    • Coronary artery disease    • Diabetes mellitus (CMS/HCC)    • Fatigue    • Hyperlipidemia    • Hypertension    • Lightheadedness    • SOB (shortness of breath)    • Supraventricular tachycardia (CMS/HCC)    • Tachycardia      Past Surgical History:    Procedure Laterality Date   • ATRIAL ABLATION SURGERY     • CARDIAC ABLATION      atrial flutter     Outpatient Medications Prior to Visit   Medication Sig Dispense Refill   • amLODIPine (NORVASC) 2.5 MG tablet Take 1 tablet by mouth 2 (Two) Times a Day. 180 tablet 1   • aspirin 81 MG EC tablet Take 81 mg by mouth Daily.     • glipiZIDE (GLUCOTROL) 5 MG tablet Take 5 mg by mouth. 2 po in the am and 1 po in the pm     • lisinopril (PRINIVIL,ZESTRIL) 20 MG tablet Take 1 tablet by mouth 2 (Two) Times a Day. 180 tablet 1   • metoprolol tartrate (LOPRESSOR) 50 MG tablet Take 1 tablet by mouth Every 12 (Twelve) Hours. 180 tablet 1   • simvastatin (ZOCOR) 10 MG tablet Take 1 tablet by mouth daily.     • hydrochlorothiazide (HYDRODIURIL) 12.5 MG tablet Take 1 tablet by mouth Daily. 90 tablet 1   • hydrochlorothiazide (HYDRODIURIL) 12.5 MG tablet TAKE 1 TABLET DAILY 90 tablet 3     No facility-administered medications prior to visit.        Allergies as of 08/07/2018   • (No Known Allergies)     Social History     Social History   • Marital status:      Spouse name: N/A   • Number of children: N/A   • Years of education: N/A     Occupational History   • Not on file.     Social History Main Topics   • Smoking status: Never Smoker   • Smokeless tobacco: Never Used   • Alcohol use No      Comment: No caffeine use   • Drug use: No   • Sexual activity: Not on file     Other Topics Concern   • Not on file     Social History Narrative   • No narrative on file     Family History   Problem Relation Age of Onset   • Hypertension Mother    • Diabetes Other         Unspecified Aunt     Review of Systems   Constitution: Negative for fever, malaise/fatigue, weight gain and weight loss.   HENT: Negative for ear pain, hearing loss, nosebleeds and sore throat.    Eyes: Negative for double vision, pain, vision loss in left eye and vision loss in right eye.   Cardiovascular:        See history of present illness.   Respiratory:  "Negative for cough, shortness of breath, sleep disturbances due to breathing, snoring and wheezing.    Endocrine: Negative for cold intolerance, heat intolerance and polyuria.   Skin: Negative for itching, poor wound healing and rash.   Musculoskeletal: Negative for joint pain, joint swelling and myalgias.   Gastrointestinal: Negative for abdominal pain, diarrhea, hematochezia, nausea and vomiting.   Genitourinary: Negative for hematuria and hesitancy.   Neurological: Negative for numbness, paresthesias and seizures.   Psychiatric/Behavioral: Negative for depression. The patient is not nervous/anxious.         Objective:     Vitals:    08/07/18 1059   BP: 114/80   Pulse: 57   Weight: 79.8 kg (176 lb)   Height: 172.7 cm (68\")     Body mass index is 26.76 kg/m².    Physical Exam   Constitutional: He is oriented to person, place, and time. He appears well-developed and well-nourished.   HENT:   Head: Normocephalic.   Nose: Nose normal.   Mouth/Throat: Oropharynx is clear and moist.   Eyes: Pupils are equal, round, and reactive to light. Conjunctivae and EOM are normal. Right eye exhibits no discharge. No scleral icterus.   Neck: Normal range of motion. Neck supple. No JVD present. No thyromegaly present.   Cardiovascular: Normal rate, regular rhythm, normal heart sounds and intact distal pulses.  Exam reveals no gallop and no friction rub.    No murmur heard.  Pulses:       Carotid pulses are 2+ on the right side, and 2+ on the left side.       Radial pulses are 2+ on the right side, and 2+ on the left side.        Femoral pulses are 2+ on the right side, and 2+ on the left side.       Popliteal pulses are 2+ on the right side, and 2+ on the left side.        Dorsalis pedis pulses are 2+ on the right side, and 2+ on the left side.        Posterior tibial pulses are 2+ on the right side, and 2+ on the left side.   Pulmonary/Chest: Effort normal and breath sounds normal. No respiratory distress. He has no wheezes. He has " no rales.   Abdominal: Soft. Bowel sounds are normal. He exhibits no distension. There is no hepatosplenomegaly. There is no tenderness. There is no rebound.   Musculoskeletal: Normal range of motion. He exhibits no edema or tenderness.   Neurological: He is alert and oriented to person, place, and time.   Skin: Skin is warm and dry. No rash noted. No erythema.   Psychiatric: He has a normal mood and affect. His behavior is normal. Judgment and thought content normal.   Vitals reviewed.    Lab Review:     ECG 12 Lead  Date/Time: 8/7/2018 11:00 AM  Performed by: MARTHA BANUELOS  Authorized by: MARTHA BANUELOS   Comparison: compared with previous ECG   Similar to previous ECG  Rhythm: sinus rhythm  Conduction: non-specific intraventricular conduction delay  Clinical impression: abnormal ECG          Assessment:       Diagnosis Plan   1. Coronary artery disease involving native coronary artery of native heart without angina pectoris  ECG 12 Lead    Treadmill Stress Test   2. Essential hypertension     3. Nonischemic cardiomyopathy (CMS/HCC)     4. SVT (supraventricular tachycardia) (CMS/HCC)     5. Type 2 diabetes mellitus with other circulatory complication, without long-term current use of insulin (CMS/HCC)       Plan:       1.  Modest coronary artery disease, clinically stable. Will check a TMET as it has been a few years since his last evaluation  2.  Hypertension.  Controlled  3.  Transient cardiomyopathy.  Likely tachycardia mediated.  Resolved  4.  WPW, paroxysmal supra-ventricular tachycardia and atrial flutter, status post ablation x  3  5.  Diabetes  6.  Dyslipidemia  7.  Hypertension    Coronary Artery Disease  Assessment  • The patient has no angina    Plan  • Lifestyle modifications discussed include adhering to a heart healthy diet    Subjective - Objective  • Current antiplatelet therapy includes aspirin 81 mg         Your medication list          Accurate as of 8/7/18 11:59 PM. If you have any questions, ask  your nurse or doctor.               CONTINUE taking these medications      Instructions Last Dose Given Next Dose Due   amLODIPine 2.5 MG tablet  Commonly known as:  NORVASC      Take 1 tablet by mouth 2 (Two) Times a Day.       aspirin 81 MG EC tablet      Take 81 mg by mouth Daily.       glipiZIDE 5 MG tablet  Commonly known as:  GLUCOTROL      Take 5 mg by mouth. 2 po in the am and 1 po in the pm       lisinopril 20 MG tablet  Commonly known as:  PRINIVIL,ZESTRIL      Take 1 tablet by mouth 2 (Two) Times a Day.       metoprolol tartrate 50 MG tablet  Commonly known as:  LOPRESSOR      Take 1 tablet by mouth Every 12 (Twelve) Hours.       simvastatin 10 MG tablet  Commonly known as:  ZOCOR      Take 1 tablet by mouth daily.          STOP taking these medications    hydrochlorothiazide 12.5 MG tablet  Commonly known as:  HYDRODIURIL  Stopped by:  Lori Cruz MD                   Dictated utilizing Dragon dictation

## 2018-08-21 ENCOUNTER — HOSPITAL ENCOUNTER (OUTPATIENT)
Dept: CARDIOLOGY | Facility: HOSPITAL | Age: 78
Discharge: HOME | End: 2018-08-21
Attending: INTERNAL MEDICINE | Admitting: INTERNAL MEDICINE

## 2018-08-21 DIAGNOSIS — I25.10 CORONARY ARTERY DISEASE INVOLVING NATIVE CORONARY ARTERY OF NATIVE HEART WITHOUT ANGINA PECTORIS: ICD-10-CM

## 2018-08-21 LAB
BH CV STRESS BP STAGE 1: NORMAL
BH CV STRESS BP STAGE 2: NORMAL
BH CV STRESS DURATION MIN STAGE 1: 3
BH CV STRESS DURATION MIN STAGE 2: 3
BH CV STRESS DURATION SEC STAGE 1: 0
BH CV STRESS DURATION SEC STAGE 2: 0
BH CV STRESS DURATION SEC STAGE 3: 30
BH CV STRESS GRADE STAGE 1: 10
BH CV STRESS GRADE STAGE 2: 12
BH CV STRESS GRADE STAGE 3: 14
BH CV STRESS HR STAGE 1: 113
BH CV STRESS HR STAGE 2: 136
BH CV STRESS HR STAGE 3: 141
BH CV STRESS METS STAGE 1: 5
BH CV STRESS METS STAGE 2: 7.5
BH CV STRESS METS STAGE 3: 10
BH CV STRESS PROTOCOL 1: NORMAL
BH CV STRESS RECOVERY BP: NORMAL MMHG
BH CV STRESS RECOVERY HR: 98 BPM
BH CV STRESS SPEED STAGE 1: 1.7
BH CV STRESS SPEED STAGE 2: 2.5
BH CV STRESS SPEED STAGE 3: 3.4
BH CV STRESS STAGE 1: 1
BH CV STRESS STAGE 2: 2
BH CV STRESS STAGE 3: 3
MAXIMAL PREDICTED HEART RATE: 145 BPM
PERCENT MAX PREDICTED HR: 97.24 %
STRESS BASELINE BP: NORMAL MMHG
STRESS BASELINE HR: 84 BPM
STRESS PERCENT HR: 114 %
STRESS POST ESTIMATED WORKLOAD: 7 METS
STRESS POST EXERCISE DUR MIN: 6 MIN
STRESS POST EXERCISE DUR SEC: 30 SEC
STRESS POST PEAK BP: NORMAL MMHG
STRESS POST PEAK HR: 141 BPM
STRESS TARGET HR: 123 BPM

## 2018-08-21 PROCEDURE — 93017 CV STRESS TEST TRACING ONLY: CPT

## 2018-08-21 PROCEDURE — 93016 CV STRESS TEST SUPVJ ONLY: CPT | Performed by: INTERNAL MEDICINE

## 2018-08-21 PROCEDURE — 93018 CV STRESS TEST I&R ONLY: CPT | Performed by: INTERNAL MEDICINE

## 2018-08-27 ENCOUNTER — TELEPHONE (OUTPATIENT)
Dept: CARDIOLOGY | Facility: CLINIC | Age: 78
End: 2018-08-27

## 2018-08-27 RX ORDER — AMLODIPINE BESYLATE 2.5 MG/1
2.5 TABLET ORAL 2 TIMES DAILY
Qty: 180 TABLET | Refills: 1 | Status: SHIPPED | OUTPATIENT
Start: 2018-08-27 | End: 2019-03-28 | Stop reason: SDUPTHER

## 2018-08-27 RX ORDER — LISINOPRIL 20 MG/1
20 TABLET ORAL 2 TIMES DAILY
Qty: 180 TABLET | Refills: 1 | Status: SHIPPED | OUTPATIENT
Start: 2018-08-27 | End: 2019-03-28 | Stop reason: SDUPTHER

## 2018-08-27 RX ORDER — METOPROLOL TARTRATE 50 MG/1
50 TABLET, FILM COATED ORAL EVERY 12 HOURS SCHEDULED
Qty: 180 TABLET | Refills: 1 | Status: SHIPPED | OUTPATIENT
Start: 2018-08-27 | End: 2019-03-28 | Stop reason: SDUPTHER

## 2019-02-12 ENCOUNTER — OFFICE VISIT (OUTPATIENT)
Dept: CARDIOLOGY | Facility: CLINIC | Age: 79
End: 2019-02-12

## 2019-02-12 VITALS
HEIGHT: 68 IN | DIASTOLIC BLOOD PRESSURE: 80 MMHG | SYSTOLIC BLOOD PRESSURE: 124 MMHG | BODY MASS INDEX: 26.67 KG/M2 | HEART RATE: 67 BPM | WEIGHT: 176 LBS

## 2019-02-12 DIAGNOSIS — I10 ESSENTIAL HYPERTENSION: Primary | ICD-10-CM

## 2019-02-12 DIAGNOSIS — E11.59 TYPE 2 DIABETES MELLITUS WITH OTHER CIRCULATORY COMPLICATION, WITHOUT LONG-TERM CURRENT USE OF INSULIN (HCC): ICD-10-CM

## 2019-02-12 DIAGNOSIS — I25.10 CORONARY ARTERY DISEASE INVOLVING NATIVE CORONARY ARTERY OF NATIVE HEART WITHOUT ANGINA PECTORIS: ICD-10-CM

## 2019-02-12 DIAGNOSIS — I47.1 SVT (SUPRAVENTRICULAR TACHYCARDIA) (HCC): ICD-10-CM

## 2019-02-12 DIAGNOSIS — I42.8 NONISCHEMIC CARDIOMYOPATHY (HCC): ICD-10-CM

## 2019-02-12 PROCEDURE — 93000 ELECTROCARDIOGRAM COMPLETE: CPT | Performed by: INTERNAL MEDICINE

## 2019-02-12 PROCEDURE — 99213 OFFICE O/P EST LOW 20 MIN: CPT | Performed by: INTERNAL MEDICINE

## 2019-02-12 NOTE — PROGRESS NOTES
Date of Office Visit: 2019  Encounter Provider: Lori Cruz MD  Place of Service: [facility] CARDIOLOGY  Patient Name: Gómez Franco  :1940    Chief complaint  Followup coronary artery disease, atrial flutter and transient cardiomyopathy    History of Present Illness  The patient is a delightful, 78-year-old gentleman with diabetes, hypertension, and hyperlipidemia who was seen in 2015 with supraventricular tachycardia for which he initially underwent ablation, which was thought to be successful.  At that time, his ejection fraction was 35%.  However, he had recurrent palpitations and tachycardia with flutter for which he was placed on Xarelto and subsequently underwent ablation in 2015.  He then again had recurrent flutter and had a third ablation in 2015.  He subsequently was sent home and has done well and has not had any recurrent known atrial arrhythmias.  He remains on Xarelto and metoprolol.  He also in 2015 had cardiac catheterization that revealed modest coronary artery disease with 50% stenosis of the left anterior descending and a small obtuse marginal branch.  His last echocardiogram in 2015 revealed an ejection fraction of 35% and small patent foramen ovale.  He had a follow-up echocardiogram on 2017 that showed normal systolic function with an ejection fraction of 64%.  No significant valvular heart disease was present.  In 2018 he had a follow-up treadmill exercise stress test that was negative for ischemia at a good workload    Since last visit his blood pressures been as it is today.  He denies any chest pain, shortness of breath, palpitations, syncope or near syncope.  He is very active around his home but not consistently exercising.  He stopped aspirin on his own.     Past Medical History:   Diagnosis Date   • Atrial flutter (CMS/HCC)    • Chest pain    • Coronary artery disease    • Diabetes mellitus (CMS/HCC)    •  Fatigue    • Hyperlipidemia    • Hypertension    • Lightheadedness    • Nonischemic cardiomyopathy (CMS/HCC)    • SOB (shortness of breath)    • Supraventricular tachycardia (CMS/HCC)    • Tachycardia      Past Surgical History:   Procedure Laterality Date   • ATRIAL ABLATION SURGERY     • CARDIAC ABLATION      atrial flutter     Outpatient Medications Prior to Visit   Medication Sig Dispense Refill   • amLODIPine (NORVASC) 2.5 MG tablet Take 1 tablet by mouth 2 (Two) Times a Day. 180 tablet 1   • glipiZIDE (GLUCOTROL) 5 MG tablet Take 5 mg by mouth. 2 po in the am and 1 po in the pm     • lisinopril (PRINIVIL,ZESTRIL) 20 MG tablet Take 1 tablet by mouth 2 (Two) Times a Day. 180 tablet 1   • metoprolol tartrate (LOPRESSOR) 50 MG tablet Take 1 tablet by mouth Every 12 (Twelve) Hours. 180 tablet 1   • simvastatin (ZOCOR) 10 MG tablet Take 1 tablet by mouth daily.     • aspirin 81 MG EC tablet Take 81 mg by mouth Daily.       No facility-administered medications prior to visit.        Allergies as of 02/12/2019   • (No Known Allergies)     Social History     Socioeconomic History   • Marital status:      Spouse name: Not on file   • Number of children: Not on file   • Years of education: Not on file   • Highest education level: Not on file   Social Needs   • Financial resource strain: Not on file   • Food insecurity - worry: Not on file   • Food insecurity - inability: Not on file   • Transportation needs - medical: Not on file   • Transportation needs - non-medical: Not on file   Occupational History   • Not on file   Tobacco Use   • Smoking status: Never Smoker   • Smokeless tobacco: Never Used   Substance and Sexual Activity   • Alcohol use: No     Comment: No caffeine use   • Drug use: No   • Sexual activity: Not on file   Other Topics Concern   • Not on file   Social History Narrative   • Not on file     Family History   Problem Relation Age of Onset   • Hypertension Mother    • Diabetes Other          "Unspecified Aunt     Review of Systems   Constitution: Negative for fever, malaise/fatigue, weight gain and weight loss.   HENT: Negative for ear pain, hearing loss, nosebleeds and sore throat.    Eyes: Negative for double vision, pain, vision loss in left eye and vision loss in right eye.   Cardiovascular:        See history of present illness.   Respiratory: Negative for cough, shortness of breath, sleep disturbances due to breathing, snoring and wheezing.    Endocrine: Negative for cold intolerance, heat intolerance and polyuria.   Skin: Negative for itching, poor wound healing and rash.   Musculoskeletal: Negative for joint pain, joint swelling and myalgias.   Gastrointestinal: Negative for abdominal pain, diarrhea, hematochezia, nausea and vomiting.   Genitourinary: Negative for hematuria and hesitancy.   Neurological: Negative for numbness, paresthesias and seizures.   Psychiatric/Behavioral: Negative for depression. The patient is not nervous/anxious.         Objective:     Vitals:    02/12/19 0950   BP: 124/80   BP Location: Left arm   Patient Position: Sitting   Pulse: 67   Weight: 79.8 kg (176 lb)   Height: 172.7 cm (68\")     Body mass index is 26.76 kg/m².    Physical Exam   Constitutional: He is oriented to person, place, and time. He appears well-developed and well-nourished.   HENT:   Head: Normocephalic.   Nose: Nose normal.   Mouth/Throat: Oropharynx is clear and moist.   Eyes: Conjunctivae and EOM are normal. Pupils are equal, round, and reactive to light. Right eye exhibits no discharge. No scleral icterus.   Neck: Normal range of motion. Neck supple. No JVD present. No thyromegaly present.   Cardiovascular: Normal rate, regular rhythm, normal heart sounds and intact distal pulses. Exam reveals no gallop and no friction rub.   No murmur heard.  Pulses:       Carotid pulses are 2+ on the right side, and 2+ on the left side.       Radial pulses are 2+ on the right side, and 2+ on the left side.       "  Femoral pulses are 2+ on the right side, and 2+ on the left side.       Popliteal pulses are 2+ on the right side, and 2+ on the left side.        Dorsalis pedis pulses are 2+ on the right side, and 2+ on the left side.        Posterior tibial pulses are 2+ on the right side, and 2+ on the left side.   Pulmonary/Chest: Effort normal and breath sounds normal. No respiratory distress. He has no wheezes. He has no rales.   Abdominal: Soft. Bowel sounds are normal. He exhibits no distension. There is no hepatosplenomegaly. There is no tenderness. There is no rebound.   Musculoskeletal: Normal range of motion. He exhibits no edema or tenderness.   Neurological: He is alert and oriented to person, place, and time.   Skin: Skin is warm and dry. No rash noted. No erythema.   Psychiatric: He has a normal mood and affect. His behavior is normal. Judgment and thought content normal.   Vitals reviewed.    Lab Review:     ECG 12 Lead  Date/Time: 2/12/2019 10:56 AM  Performed by: Lori Cruz MD  Authorized by: Lori Cruz MD   Comparison: compared with previous ECG   Similar to previous ECG  Rhythm: sinus rhythm    Clinical impression: normal ECG          Assessment:       Diagnosis Plan   1. Essential hypertension     2. Coronary artery disease involving native coronary artery of native heart without angina pectoris  ECG 12 Lead   3. Nonischemic cardiomyopathy (CMS/HCC)     4. SVT (supraventricular tachycardia) (CMS/HCC)     5. Type 2 diabetes mellitus with other circulatory complication, without long-term current use of insulin (CMS/HCC)       Plan:       1.  Modest coronary artery disease, clinically stable with negative stress test in August 2018.  Will resume coated baby aspirin 1 a day.  2.  Hypertension.  Controlled  3.  Transient cardiomyopathy.  Likely tachycardia mediated.  Resolved  4.  WPW, paroxysmal supra-ventricular tachycardia and atrial flutter, status post ablation x  3  5.  Diabetes  6.  Dyslipidemia  7.   Hypertension    Coronary Artery Disease  Assessment  • The patient has no angina    Plan  • Lifestyle modifications discussed include adhering to a heart healthy diet    Subjective - Objective  • Current antiplatelet therapy includes aspirin 81 mg           Your medication list           Accurate as of 2/12/19 11:59 PM. If you have any questions, ask your nurse or doctor.               CONTINUE taking these medications      Instructions Last Dose Given Next Dose Due   amLODIPine 2.5 MG tablet  Commonly known as:  NORVASC      Take 1 tablet by mouth 2 (Two) Times a Day.       glipiZIDE 5 MG tablet  Commonly known as:  GLUCOTROL      Take 5 mg by mouth. 2 po in the am and 1 po in the pm       lisinopril 20 MG tablet  Commonly known as:  PRINIVIL,ZESTRIL      Take 1 tablet by mouth 2 (Two) Times a Day.       metoprolol tartrate 50 MG tablet  Commonly known as:  LOPRESSOR      Take 1 tablet by mouth Every 12 (Twelve) Hours.       simvastatin 10 MG tablet  Commonly known as:  ZOCOR      Take 1 tablet by mouth daily.          STOP taking these medications    aspirin 81 MG EC tablet  Stopped by:  Lori Cruz MD             Patient is no longer taking asa.  I corrected the med list to reflect this.  I did not stop these medications.    Dictated utilizing Dragon dictation

## 2019-03-28 RX ORDER — AMLODIPINE BESYLATE 2.5 MG/1
2.5 TABLET ORAL 2 TIMES DAILY
Qty: 180 TABLET | Refills: 3 | Status: SHIPPED | OUTPATIENT
Start: 2019-03-28

## 2019-03-28 RX ORDER — LISINOPRIL 20 MG/1
20 TABLET ORAL 2 TIMES DAILY
Qty: 180 TABLET | Refills: 3 | Status: SHIPPED | OUTPATIENT
Start: 2019-03-28

## 2019-03-28 RX ORDER — METOPROLOL TARTRATE 50 MG/1
50 TABLET, FILM COATED ORAL EVERY 12 HOURS SCHEDULED
Qty: 180 TABLET | Refills: 3 | Status: SHIPPED | OUTPATIENT
Start: 2019-03-28

## 2020-09-24 ENCOUNTER — OFFICE VISIT (OUTPATIENT)
Dept: ORTHOPEDIC SURGERY | Facility: CLINIC | Age: 80
End: 2020-09-24

## 2020-09-24 VITALS
WEIGHT: 165 LBS | HEIGHT: 68 IN | BODY MASS INDEX: 25.01 KG/M2 | SYSTOLIC BLOOD PRESSURE: 159 MMHG | DIASTOLIC BLOOD PRESSURE: 82 MMHG | HEART RATE: 72 BPM

## 2020-09-24 DIAGNOSIS — R52 PAIN: Primary | ICD-10-CM

## 2020-09-24 DIAGNOSIS — M17.11 PRIMARY OSTEOARTHRITIS OF RIGHT KNEE: ICD-10-CM

## 2020-09-24 PROCEDURE — 20610 DRAIN/INJ JOINT/BURSA W/O US: CPT | Performed by: NURSE PRACTITIONER

## 2020-09-24 PROCEDURE — 99203 OFFICE O/P NEW LOW 30 MIN: CPT | Performed by: NURSE PRACTITIONER

## 2020-09-24 PROCEDURE — 73562 X-RAY EXAM OF KNEE 3: CPT | Performed by: NURSE PRACTITIONER

## 2020-09-24 RX ORDER — BETAMETHASONE SODIUM PHOSPHATE AND BETAMETHASONE ACETATE 3; 3 MG/ML; MG/ML
12 INJECTION, SUSPENSION INTRA-ARTICULAR; INTRALESIONAL; INTRAMUSCULAR; SOFT TISSUE
Status: COMPLETED | OUTPATIENT
Start: 2020-09-24 | End: 2020-09-24

## 2020-09-24 RX ORDER — LIDOCAINE HYDROCHLORIDE 10 MG/ML
8 INJECTION, SOLUTION EPIDURAL; INFILTRATION; INTRACAUDAL; PERINEURAL
Status: COMPLETED | OUTPATIENT
Start: 2020-09-24 | End: 2020-09-24

## 2020-09-24 RX ADMIN — BETAMETHASONE SODIUM PHOSPHATE AND BETAMETHASONE ACETATE 12 MG: 3; 3 INJECTION, SUSPENSION INTRA-ARTICULAR; INTRALESIONAL; INTRAMUSCULAR; SOFT TISSUE at 14:58

## 2020-09-24 RX ADMIN — LIDOCAINE HYDROCHLORIDE 8 ML: 10 INJECTION, SOLUTION EPIDURAL; INFILTRATION; INTRACAUDAL; PERINEURAL at 14:58

## 2020-09-24 NOTE — PATIENT INSTRUCTIONS
Post-injection instructions    ? Apply ice to the injection side as needed to relieve pain, 10-15 minutes on and off every few hours.     ? Watch for signs and symptoms of infection, including significantly increased pain, redness, swelling, warmth to the touch, fevers, sweats, chills. If these symptoms occur, please notify our office immediately [phone].    ? Keep the injection site clean. You may remove adhesive bandage once bleeding has stopped.    ? Do not engage in any new or strenuous activities for at least the next 24 hours until soreness has stopped.    ? Some people will receive immediate relief with a steroid injection however it takes several days before the steroid starts working. You may receive 3-4 steroid injections a year, if necessary. If you get no relief with from the injection, we will continue to work with you to explore other treatment options.    ? It will take approximately four weeks after the last (or single) gel injection before you notice symptom relief.    * Some people experience redness or feeling of warmth after receiving corticosteroid injection. If you have diabetes, the corticosteroid injection may temporarily increase your blood sugar levels. Continue to check glucose levels and if not improving, contact your primary care provider for further treatment.   * Please keep mind that this is not a one-size-fits all approach. If you have questions or concerns, contact our office.

## 2020-09-24 NOTE — PROGRESS NOTES
Subjective:     Patient ID: Gómez Franco is a 80 y.o. male.    Chief Complaint:  Right knee pain, new issue to examiner  History of Present Illness  Gómez Franco 80-year-old male presents to clinic with new onset of pain over the last 1 month at the right knee.  Maximal tenderness present the medial joint line, medial aspect of the right knee.  Began experiencing pain approximately 1 year ago after he stepped down off a tractor naveen the knee decreased after about 1 month however for the last 1 month pain has returned.  Has tried heating pad as well as electric blanket which does help with symptom relief.  Worsen pain noted when he seated with the knee slightly flexed begins experience pain aching in nature.  He is able to stabilize the knee by holding with his hand which does significantly help with symptom relief positive increased pain noted with activities involving deep flexion, transitional activity such as simply to standing attempting to walk.  Rates discomfort at worst an 8-9 out of a 10 describes pain mainly as throbbing and deep aching in nature.  He is type II diabetic fairly good glucose control.  Denies any previous x-ray, MRI, CT, corticosteroid injection, Visco supplementation injection to the right knee.  Denies other concerns present this time.     Social History     Occupational History   • Not on file   Tobacco Use   • Smoking status: Never Smoker   • Smokeless tobacco: Never Used   Substance and Sexual Activity   • Alcohol use: No     Comment: No caffeine use   • Drug use: No   • Sexual activity: Not on file      Review of Systems   Constitutional: Negative for chills, diaphoresis, fever and unexpected weight change.   HENT: Negative for hearing loss, nosebleeds, sore throat and tinnitus.    Eyes: Negative for pain and visual disturbance.   Respiratory: Negative for cough, shortness of breath and wheezing.    Cardiovascular: Negative for chest pain and palpitations.    Gastrointestinal: Negative for abdominal pain, diarrhea, nausea and vomiting.   Endocrine: Negative for cold intolerance, heat intolerance and polydipsia.   Genitourinary: Negative for difficulty urinating, dysuria and hematuria.   Musculoskeletal: Positive for arthralgias. Negative for joint swelling and myalgias.   Skin: Negative for rash and wound.   Allergic/Immunologic: Negative for environmental allergies.   Neurological: Negative for dizziness, syncope and numbness.   Hematological: Does not bruise/bleed easily.   Psychiatric/Behavioral: Negative for dysphoric mood and sleep disturbance. The patient is not nervous/anxious.          Past Medical History:   Diagnosis Date   • Atrial flutter (CMS/HCC)    • Chest pain    • Coronary artery disease    • Diabetes mellitus (CMS/HCC)    • Fatigue    • Hyperlipidemia    • Hypertension    • Lightheadedness    • Nonischemic cardiomyopathy (CMS/HCC)    • SOB (shortness of breath)    • Supraventricular tachycardia (CMS/HCC)    • Tachycardia      Past Surgical History:   Procedure Laterality Date   • ATRIAL ABLATION SURGERY     • CARDIAC ABLATION      atrial flutter     Family History   Problem Relation Age of Onset   • Hypertension Mother    • Diabetes Other         Unspecified Aunt         Objective:  Physical Exam    Vital signs reviewed.   General: No acute distress.  Eyes: conjunctiva clear; pupils equally round and reactive  ENT: external ears and nose atraumatic; oropharynx clear  CV: no peripheral edema  Resp: normal respiratory effort  Skin: no rashes or wounds; normal turgor  Psych: mood and affect appropriate; recent and remote memory intact    Vitals:    09/24/20 1436   BP: 159/82   Pulse: 72         09/24/20  1436   Weight: 74.8 kg (165 lb)     Body mass index is 25.09 kg/m².        Right Knee Exam     Tenderness   The patient is experiencing tenderness in the medial joint line and medial retinaculum.    Range of Motion   Extension: 0   Flexion: 120     Tests    Marty:  Medial - positive Lateral - negative  Varus: negative Valgus: negative  Lachman:  Anterior - 1+    Posterior - negative  Drawer:  Anterior - negative    Posterior - negative  Patellar apprehension: positive    Other   Erythema: absent  Sensation: normal  Pulse: present  Swelling: mild    Comments:  Positive crepitus throughout arc of motion  Positive active patellar compression test  Mild joint effusion               Assessment:        1. Pain    2. Primary osteoarthritis of right knee           Plan:  1.  Discussed plan of care with patient.  Wishes to proceed corticosteroid injection right knee.  We will plan to see him back in clinic in 6 weeks to reevaluate.  I do recommend application of ice at injection site heat as needed for pain.  Encouraged again to call with any questions concerns he has between now and follow-up.    Large Joint Arthrocentesis: R knee  Date/Time: 9/24/2020 2:58 PM  Consent given by: patient  Site marked: site marked  Timeout: Immediately prior to procedure a time out was called to verify the correct patient, procedure, equipment, support staff and site/side marked as required   Supporting Documentation  Indications: pain and joint swelling   Procedure Details  Location: knee - R knee  Preparation: Patient was prepped and draped in the usual sterile fashion  Needle size: 22 G  Approach: superolateral.  Medications administered: 8 mL lidocaine PF 1% 1 %; 12 mg betamethasone acetate-betamethasone sodium phosphate 6 (3-3) MG/ML  Patient tolerance: patient tolerated the procedure well with no immediate complications              Work Status:    Orders:  Orders Placed This Encounter   Procedures   • Large Joint Arthrocentesis: R knee   • XR Knee 3+ View With [place] Right       Medications:  No orders of the defined types were placed in this encounter.      Followup:  No follow-ups on file.          Dictated utilizing Dragon dictation

## 2020-10-07 ENCOUNTER — OFFICE VISIT (OUTPATIENT)
Dept: ORTHOPEDIC SURGERY | Facility: CLINIC | Age: 80
End: 2020-10-07

## 2020-10-07 ENCOUNTER — TELEPHONE (OUTPATIENT)
Dept: ORTHOPEDIC SURGERY | Facility: CLINIC | Age: 80
End: 2020-10-07

## 2020-10-07 VITALS — BODY MASS INDEX: 25.01 KG/M2 | HEIGHT: 68 IN | WEIGHT: 165 LBS

## 2020-10-07 DIAGNOSIS — M17.11 PRIMARY OSTEOARTHRITIS OF RIGHT KNEE: Primary | ICD-10-CM

## 2020-10-07 PROCEDURE — 99214 OFFICE O/P EST MOD 30 MIN: CPT | Performed by: NURSE PRACTITIONER

## 2020-10-07 NOTE — TELEPHONE ENCOUNTER
Gómez said the brace is not working.He went to [facility] and it does not help... He wants to go ahead and submit for gel injections.

## 2020-10-07 NOTE — PROGRESS NOTES
Subjective:     Patient ID: Gómez Franco is a 80 y.o. male.    Chief Complaint:  Follow-up DJD right knee  History of Present Illness  Gómez Franco returns to clinic for follow-up DJD right knee.  Received corticosteroid injection last visit 9/24/2020 with significant symptom relief.  At this time is only experiencing pain with longer distance ambulating on uneven surfaces.  Maximal tenderness is present at the medial compartment.  Pain with rest, ambulating on even surfaces activities involving deep flexion completely resolved.  Rates discomfort 4-5 out of a 10 mainly aching throbbing in nature again with ambulating on uneven surfaces only.  Has tried knee sleeve over-the-counter in past without any significant symptom relief.  Denies of the knee is locking, catching or giving away.  Pain is not radiating to the groin or to the lateral aspect of hip.  Denies other concerns present time.       Social History     Occupational History   • Not on file   Tobacco Use   • Smoking status: Never Smoker   • Smokeless tobacco: Never Used   Substance and Sexual Activity   • Alcohol use: No     Comment: No caffeine use   • Drug use: No   • Sexual activity: Defer      Past Medical History:   Diagnosis Date   • Atrial flutter (CMS/HCC)    • Chest pain    • Coronary artery disease    • Diabetes mellitus (CMS/HCC)    • Fatigue    • Hyperlipidemia    • Hypertension    • Lightheadedness    • Nonischemic cardiomyopathy (CMS/HCC)    • SOB (shortness of breath)    • Supraventricular tachycardia (CMS/HCC)    • Tachycardia      Past Surgical History:   Procedure Laterality Date   • ATRIAL ABLATION SURGERY     • CARDIAC ABLATION      atrial flutter       Family History   Problem Relation Age of Onset   • Hypertension Mother    • Diabetes Other         Unspecified Aunt         Review of Systems   Constitutional: Negative for chills, diaphoresis, fever and unexpected weight change.   HENT: Negative for hearing loss, nosebleeds,  "sore throat and tinnitus.    Eyes: Negative for pain and visual disturbance.   Respiratory: Negative for cough, shortness of breath and wheezing.    Cardiovascular: Negative for chest pain and palpitations.   Gastrointestinal: Negative for abdominal pain, diarrhea, nausea and vomiting.   Endocrine: Negative for cold intolerance, heat intolerance and polydipsia.   Genitourinary: Negative for difficulty urinating, dysuria and hematuria.   Musculoskeletal: Positive for arthralgias and myalgias. Negative for joint swelling.   Skin: Negative for rash and wound.   Allergic/Immunologic: Negative for environmental allergies.   Neurological: Negative for dizziness, syncope and numbness.   Hematological: Does not bruise/bleed easily.   Psychiatric/Behavioral: Negative for dysphoric mood and sleep disturbance. The patient is not nervous/anxious.            Objective:  Physical Exam  General: No acute distress.  Eyes: conjunctiva clear; pupils equally round and reactive  ENT: external ears and nose atraumatic; oropharynx clear  CV: no peripheral edema  Resp: normal respiratory effort  Skin: no rashes or wounds; normal turgor  Psych: mood and affect appropriate; recent and remote memory intact    Vitals:    10/07/20 1057   Weight: 74.8 kg (165 lb)   Height: 172.7 cm (68\")         10/07/20  1057   Weight: 74.8 kg (165 lb)     Body mass index is 25.09 kg/m².      Ortho Exam     Right Knee Exam      Tenderness   The patient is experiencing tenderness in the medial joint line and medial retinaculum.     Range of Motion   Extension: 0   Flexion: 130     Tests   Marty:  Medial - positive Lateral - negative  Varus: negative Valgus: negative  Lachman:  Anterior - 1+    Posterior - negative  Drawer:  Anterior - negative    Posterior - negative  Patellar apprehension: positive     Other   Erythema: absent  Sensation: normal  Pulse: present  Swelling:  Negative     Comments:    Positive crepitus throughout arc of motion  Positive active " patellar compression test  Negative joint effusion    Assessment:        1. Primary osteoarthritis of right knee           Plan:  1.  Discussed plan of care with patient.  Ready brace applied to the right knee for support and stabilization.  At this time does not wish to proceed with Visco supplementation injections would like to see how the brace is going to help.  Discussed with patient if is not improving can call back in a submit for authorization for Visco supplementation injections right knee.  Can also complete corticosteroid injection once every 3 months as well.  He verbalized understanding of information agrees with plan of care.  Denies other concerns present time.  Orders:  No orders of the defined types were placed in this encounter.      Medications:  No orders of the defined types were placed in this encounter.      Followup:  No follow-ups on file.    Gómez was seen today for follow-up and pain.    Diagnoses and all orders for this visit:    Primary osteoarthritis of right knee        Dictated utilizing Dragon dictation

## 2020-10-28 ENCOUNTER — TELEPHONE (OUTPATIENT)
Dept: ORTHOPEDIC SURGERY | Facility: CLINIC | Age: 80
End: 2020-10-28

## 2020-10-28 NOTE — TELEPHONE ENCOUNTER
PATIENT CALLED CONCERNING INJECTION BEING DELIVERED TO PRACTICE TODAY.  HE WOULD LIKE TO MAKE AN APPT. AFTER CONFIRMING RECEIPT.    PLEASE CALL RAUL MOSES AT:  [phone]

## 2020-11-05 ENCOUNTER — CLINICAL SUPPORT (OUTPATIENT)
Dept: ORTHOPEDIC SURGERY | Facility: CLINIC | Age: 80
End: 2020-11-05

## 2020-11-05 DIAGNOSIS — R52 PAIN: Primary | ICD-10-CM

## 2020-11-05 DIAGNOSIS — M17.11 PRIMARY OSTEOARTHRITIS OF RIGHT KNEE: ICD-10-CM

## 2020-11-05 PROCEDURE — 20610 DRAIN/INJ JOINT/BURSA W/O US: CPT | Performed by: NURSE PRACTITIONER

## 2020-11-05 RX ORDER — GLIPIZIDE 10 MG/1
TABLET ORAL
COMMUNITY
Start: 2020-10-23

## 2020-11-05 NOTE — PROGRESS NOTES
Procedure   Large Joint Arthrocentesis  Date/Time: 11/5/2020 2:22 PM  Consent given by: patient  Site marked: site marked  Timeout: Immediately prior to procedure a time out was called to verify the correct patient, procedure, equipment, support staff and site/side marked as required   Supporting Documentation  Indications: pain   Procedure Details  Location: knee - Knee joint: right.  Preparation: Patient was prepped and draped in the usual sterile fashion  Needle size: 22 G  Approach: anterolateral  Medications administered: 88 mg Hyaluronan 88 MG/4ML  Patient tolerance: patient tolerated the procedure well with no immediate complications          Patient presents to clinic today for right knee viscosupplement injections.  This is the single injection of the series.  I explained details of injections as well as risks, benefits and alternatives with the patient today, had all questions answered, wished to proceed with injections.  I will see patient back in 8 weeks to reevaluate.  Patient was instructed to watch for signs or symptoms of infection including redness, swelling, warmth to the touch, or significant increased pain and to contact our office immediately if any of these issues were noted.

## 2021-01-06 ENCOUNTER — OFFICE VISIT (OUTPATIENT)
Dept: ORTHOPEDIC SURGERY | Facility: CLINIC | Age: 81
End: 2021-01-06

## 2021-01-06 VITALS — BODY MASS INDEX: 25.01 KG/M2 | WEIGHT: 165 LBS | HEIGHT: 68 IN

## 2021-01-06 DIAGNOSIS — M17.11 PRIMARY OSTEOARTHRITIS OF RIGHT KNEE: Primary | ICD-10-CM

## 2021-01-06 PROBLEM — E11.9 DIABETES MELLITUS, TYPE 2 (HCC): Status: ACTIVE | Noted: 2021-01-06

## 2021-01-06 PROBLEM — E78.5 DYSLIPIDEMIA: Status: ACTIVE | Noted: 2021-01-06

## 2021-01-06 PROCEDURE — 99214 OFFICE O/P EST MOD 30 MIN: CPT | Performed by: NURSE PRACTITIONER

## 2021-01-06 PROCEDURE — 20610 DRAIN/INJ JOINT/BURSA W/O US: CPT | Performed by: NURSE PRACTITIONER

## 2021-01-06 RX ORDER — TRIAMCINOLONE ACETONIDE 40 MG/ML
80 INJECTION, SUSPENSION INTRA-ARTICULAR; INTRAMUSCULAR
Status: COMPLETED | OUTPATIENT
Start: 2021-01-06 | End: 2021-01-06

## 2021-01-06 RX ORDER — LIDOCAINE HYDROCHLORIDE 10 MG/ML
8 INJECTION, SOLUTION EPIDURAL; INFILTRATION; INTRACAUDAL; PERINEURAL
Status: COMPLETED | OUTPATIENT
Start: 2021-01-06 | End: 2021-01-06

## 2021-01-06 RX ORDER — ASPIRIN 81 MG/1
81 TABLET ORAL DAILY
COMMUNITY

## 2021-01-06 RX ORDER — HYDROCHLOROTHIAZIDE 12.5 MG/1
TABLET ORAL
COMMUNITY
Start: 2020-12-03

## 2021-01-06 RX ORDER — BLOOD SUGAR DIAGNOSTIC
STRIP MISCELLANEOUS
COMMUNITY
Start: 2020-12-12

## 2021-01-06 RX ADMIN — TRIAMCINOLONE ACETONIDE 80 MG: 40 INJECTION, SUSPENSION INTRA-ARTICULAR; INTRAMUSCULAR at 14:21

## 2021-01-06 RX ADMIN — LIDOCAINE HYDROCHLORIDE 8 ML: 10 INJECTION, SOLUTION EPIDURAL; INFILTRATION; INTRACAUDAL; PERINEURAL at 14:21

## 2021-01-06 NOTE — PROGRESS NOTES
Subjective:     Patient ID: Gómez Franco is a 80 y.o. male.    Chief Complaint:  Follow-up DJD right knee  Visco injection 11/05/2020  Corticosteroid injection last 09/24/2020  History of Present Illness  Gómez Franco returns to clinic for follow-up of his right knee.  Receive viscosupplementation injection last visit 11/5/2020 has noted significant symptom improvement ever since.  Maximal tenderness present at night around 5 PM when he sits down at the medial aspect of the knee he does apply topical Voltaren which does seem to help.  He does notice some stiffness worse in the a.m. and upon wakening swelling has been well controlled.  Very pleased with symptom relief that he is achieved thus far with corticosteroid injections in the viscosupplementation injections.  Denies that the knee is locking, catching or giving way.  Does not wish to proceed with any surgical intervention at this time.  Denies other concerns present.        Social History     Occupational History   • Not on file   Tobacco Use   • Smoking status: Never Smoker   • Smokeless tobacco: Never Used   Substance and Sexual Activity   • Alcohol use: No     Comment: No caffeine use   • Drug use: No   • Sexual activity: Defer      Past Medical History:   Diagnosis Date   • Atrial flutter (CMS/HCC)    • Chest pain    • Coronary artery disease    • Diabetes mellitus (CMS/HCC)    • Fatigue    • Hyperlipidemia    • Hypertension    • Lightheadedness    • Nonischemic cardiomyopathy (CMS/HCC)    • SOB (shortness of breath)    • Supraventricular tachycardia (CMS/HCC)    • Tachycardia      Past Surgical History:   Procedure Laterality Date   • ATRIAL ABLATION SURGERY     • CARDIAC ABLATION      atrial flutter       Family History   Problem Relation Age of Onset   • Hypertension Mother    • Diabetes Other         Unspecified Aunt         Review of Systems   Constitutional: Negative for chills, diaphoresis, fever and unexpected weight change.   HENT:  "Negative for hearing loss, nosebleeds, sore throat and tinnitus.    Eyes: Negative for pain and visual disturbance.   Respiratory: Negative for cough, shortness of breath and wheezing.    Cardiovascular: Negative for chest pain and palpitations.   Gastrointestinal: Negative for abdominal pain, diarrhea, nausea and vomiting.   Endocrine: Negative for cold intolerance, heat intolerance and polydipsia.   Genitourinary: Negative for difficulty urinating, dysuria and hematuria.   Musculoskeletal: Positive for arthralgias and myalgias. Negative for joint swelling.   Skin: Negative for rash and wound.   Allergic/Immunologic: Negative for environmental allergies.   Neurological: Negative for dizziness, syncope and numbness.   Hematological: Does not bruise/bleed easily.   Psychiatric/Behavioral: Negative for dysphoric mood and sleep disturbance. The patient is not nervous/anxious.            Objective:  Physical Exam    General: No acute distress.  Eyes: conjunctiva clear; pupils equally round and reactive  ENT: external ears and nose atraumatic; oropharynx clear  CV: no peripheral edema  Resp: normal respiratory effort  Skin: no rashes or wounds; normal turgor  Psych: mood and affect appropriate; recent and remote memory intact    Vitals:    01/06/21 1415   Weight: 74.8 kg (165 lb)   Height: 172.7 cm (68\")         01/06/21  1415   Weight: 74.8 kg (165 lb)     Body mass index is 25.09 kg/m².      Right Knee Exam     Tenderness   The patient is experiencing tenderness in the medial joint line.    Range of Motion   Extension: 0   Flexion: 130     Tests   Marty:  Medial - negative Lateral - negative  Varus: negative Valgus: negative  Lachman:  Anterior - 1+    Posterior - negative  Drawer:  Anterior - negative    Posterior - negative  Patellar apprehension: negative    Other   Erythema: absent  Sensation: normal  Pulse: present  Swelling: mild  Effusion: no effusion present    Comments:  Negative active patellar compression " test  Positive crepitus throughout arc of motion          Assessment:        1. Primary osteoarthritis of right knee           Plan:  1. Discussed plan of care with patient.  Received corticosteroid injection right knee.  Plan to see him back in clinic as needed.  Continue application of topical Voltaren medial joint line continue all previously tolerated activities.  He verbalized understanding of all information agrees with plan of care.  Denies other concerns present this time.  Large Joint Arthrocentesis: R knee  Date/Time: 1/6/2021 2:21 PM  Consent given by: patient  Site marked: site marked  Timeout: Immediately prior to procedure a time out was called to verify the correct patient, procedure, equipment, support staff and site/side marked as required   Supporting Documentation  Indications: pain   Procedure Details  Location: knee - R knee  Preparation: Patient was prepped and draped in the usual sterile fashion  Needle size: 22 G  Approach: superolateral.  Medications administered: 80 mg triamcinolone acetonide 40 MG/ML; 8 mL lidocaine PF 1% 1 %  Patient tolerance: patient tolerated the procedure well with no immediate complications          Orders:  Orders Placed This Encounter   Procedures   • Large Joint Arthrocentesis: R knee       Medications:  No orders of the defined types were placed in this encounter.      Followup:  No follow-ups on file.    Diagnoses and all orders for this visit:    1. Primary osteoarthritis of right knee (Primary)  -     Large Joint Arthrocentesis: R knee        Dictated utilizing Dragon dictation

## 2021-02-27 NOTE — TELEPHONE ENCOUNTER
2/13/17  9:15  Patient left msg on result line - asking for echo results from 2/9.   [phone]/[place]  
I called patient number below and was told by his wife that he would come for a walk but she would pass on the message.  I wrote her note that the echocardiogram showed normal LV function.  She stated she would do so. [email or web address]  
The patient is a 45y Female complaining of

## 2023-08-02 ENCOUNTER — OFFICE VISIT (OUTPATIENT)
Dept: CARDIOLOGY | Facility: CLINIC | Age: 83
End: 2023-08-02
Payer: MEDICARE

## 2023-08-02 VITALS
HEIGHT: 68 IN | WEIGHT: 170 LBS | DIASTOLIC BLOOD PRESSURE: 76 MMHG | SYSTOLIC BLOOD PRESSURE: 132 MMHG | BODY MASS INDEX: 25.76 KG/M2

## 2023-08-02 DIAGNOSIS — I10 PRIMARY HYPERTENSION: Primary | ICD-10-CM

## 2023-08-02 DIAGNOSIS — I47.1 SVT (SUPRAVENTRICULAR TACHYCARDIA): ICD-10-CM

## 2023-08-02 PROCEDURE — 99204 OFFICE O/P NEW MOD 45 MIN: CPT | Performed by: INTERNAL MEDICINE

## 2023-08-02 PROCEDURE — 3075F SYST BP GE 130 - 139MM HG: CPT | Performed by: INTERNAL MEDICINE

## 2023-08-02 PROCEDURE — 3078F DIAST BP <80 MM HG: CPT | Performed by: INTERNAL MEDICINE

## 2023-08-02 PROCEDURE — 93000 ELECTROCARDIOGRAM COMPLETE: CPT | Performed by: INTERNAL MEDICINE

## 2023-08-02 RX ORDER — LISINOPRIL 40 MG/1
40 TABLET ORAL DAILY
COMMUNITY

## 2023-08-02 RX ORDER — AMLODIPINE BESYLATE 10 MG/1
10 TABLET ORAL DAILY
COMMUNITY

## 2023-08-02 NOTE — PROGRESS NOTES
Date of Office Visit: 2023  Encounter Provider: Diana Muse MA  Place of Service: [facility]  Patient Name: Gómez Franco  :1940    Chief complaint      History of Present Illness      Past Medical History:   Diagnosis Date    Atrial flutter     Chest pain     Coronary artery disease     Diabetes mellitus     Fatigue     Hyperlipidemia     Hypertension     Lightheadedness     Nonischemic cardiomyopathy     SOB (shortness of breath)     Supraventricular tachycardia     Tachycardia      Past Surgical History:   Procedure Laterality Date    ATRIAL ABLATION SURGERY      CARDIAC ABLATION      atrial flutter     Outpatient Medications Prior to Visit   Medication Sig Dispense Refill    Accu-Chek Jayashree Plus test strip       amLODIPine (NORVASC) 10 MG tablet Take 1 tablet by mouth Daily.      aspirin 81 MG EC tablet Take 1 tablet by mouth Daily.      glipizide (GLUCOTROL) 10 MG tablet TAKE 1 TABLET TWICE DAILY BEFORE MEALS      hydroCHLOROthiazide (HYDRODIURIL) 12.5 MG tablet Take 1 tablet by mouth Daily.      lisinopril (PRINIVIL,ZESTRIL) 40 MG tablet Take 1 tablet by mouth Daily.      metoprolol tartrate (LOPRESSOR) 50 MG tablet Take 1 tablet by mouth Every 12 (Twelve) Hours. 180 tablet 3    simvastatin (ZOCOR) 10 MG tablet Take 1 tablet by mouth Daily.      SITagliptin (JANUVIA) 50 MG tablet Take 1 tablet by mouth Daily.      lisinopril (PRINIVIL,ZESTRIL) 20 MG tablet Take 1 tablet by mouth 2 (Two) Times a Day. 180 tablet 3    amLODIPine (NORVASC) 2.5 MG tablet Take 1 tablet by mouth 2 (Two) Times a Day. (Patient not taking: Reported on 2023) 180 tablet 3     No facility-administered medications prior to visit.       Allergies as of 2023    (No Known Allergies)     Social History     Socioeconomic History    Marital status:    Tobacco Use    Smoking status: Never    Smokeless tobacco: Never   Substance and Sexual  "Activity    Alcohol use: No     Comment: No caffeine use    Drug use: No    Sexual activity: Defer     Family History   Problem Relation Age of Onset    Hypertension Mother     Diabetes Other         Unspecified Aunt     ROS     Objective:     Vitals:    08/02/23 1314 08/02/23 1318   BP: 138/72 132/76   BP Location: Right arm Left arm   Weight: 77.1 kg (170 lb)    Height: 172.7 cm (68\")      Body mass index is 25.85 kg/mý.    Physical Exam  Lab Review:   No results for input(s): CBC, WBC, RBC, HGB, HCT, MCV, MCH, MCHC, RDW, PLT, NEUTRORELPCT, LYMPHORELPCT, MONORELPCT, EOSRELPCT, BASORELPCT, NEUTROABS, LYMPHSABS, MONOSABS, EOSABS, BASOSABS, IMMATUREGR, IMMGRANABS, RDW, RDWSD, MPV in the last 30113 hours.  Lab Results - Last 18 Months   Lab Units 04/29/22  1015   BUN mg/dL 20   CREATININE mg/dL 1.03   SODIUM mmol/L 138   POTASSIUM mmol/L 4.2   CHLORIDE mmol/L 101   TOTAL CO2 mmol/L 25   CALCIUM mg/dL 9.2   BUN / CREAT RATIO RATIO 19.4     Lab Results - Last 18 Months   Lab Units 04/29/22  1015   BUN mg/dL 20   CREATININE mg/dL 1.03   SODIUM mmol/L 138   POTASSIUM mmol/L 4.2   CHLORIDE mmol/L 101   TOTAL CO2 mmol/L 25   CALCIUM mg/dL 9.2   TOTAL PROTEIN g/dL 6.5   ALBUMIN g/dL 4.4   ALT (SGPT) U/L 15   AST (SGOT) U/L 19   ALK PHOS U/L 52   BILIRUBIN mg/dL 0.6   GLOBULIN g/dL 2.1   BUN / CREAT RATIO RATIO 19.4     No results for input(s): CHOL, TRIG, HDL, LDL, VLDL, LDLHDL in the last 36264 hours.  No results for input(s): PROBNP in the last 88654 hours.  No results for input(s): TROPONINT in the last 20707 hours.  No results for input(s): TSH in the last 72479 hours.  No results for input(s): PROTIME, PTT in the last 45056 hours.        ECG 12 Lead    Date/Time: 8/2/2023 1:31 PM  Performed by: Lori Cruz MD  Authorized by: Lori Cruz MD     Assessment:    No diagnosis found.  Plan:                       Your medication list            Accurate as of August 2, 2023  1:31 PM. If you have any questions, ask your " nurse or doctor.                CONTINUE taking these medications        Instructions Last Dose Given Next Dose Due   Accu-Chek Jayashree Plus test strip  Generic drug: glucose blood           amLODIPine 10 MG tablet  Commonly known as: NORVASC      Take 1 tablet by mouth Daily.       aspirin 81 MG EC tablet      Take 1 tablet by mouth Daily.       glipizide 10 MG tablet  Commonly known as: GLUCOTROL      TAKE 1 TABLET TWICE DAILY BEFORE MEALS       hydroCHLOROthiazide 12.5 MG tablet  Commonly known as: HYDRODIURIL      Take 1 tablet by mouth Daily.       lisinopril 40 MG tablet  Commonly known as: PRINIVIL,ZESTRIL      Take 1 tablet by mouth Daily.       metoprolol tartrate 50 MG tablet  Commonly known as: LOPRESSOR      Take 1 tablet by mouth Every 12 (Twelve) Hours.       simvastatin 10 MG tablet  Commonly known as: ZOCOR      Take 1 tablet by mouth Daily.       SITagliptin 50 MG tablet  Commonly known as: JANUVIA      Take 1 tablet by mouth Daily.                Patient is no longer taking -.  I corrected the med list to reflect this.  I did not stop these medications.      Dictated utilizing Dragon dictation

## 2023-08-02 NOTE — PROGRESS NOTES
Date of Office Visit: 2023  Encounter Provider: Lori Cruz MD  Place of Service: [facility]  Patient Name: Gómez Franco  :1940    Chief complaint  Followup coronary artery disease, atrial flutter and transient cardiomyopathy    History of Present Illness  The patient is a delightful, 82-year-old gentleman with diabetes, hypertension, and hyperlipidemia who was seen in 2015 with supraventricular tachycardia for which he initially underwent ablation, which was thought to be successful.  At that time, his ejection fraction was 35%.  However, he had recurrent palpitations and tachycardia with flutter for which he was placed on Xarelto and subsequently underwent ablation in 2015.  He then again had recurrent flutter and had a third ablation in 2015.  He subsequently was sent home and has done well and has not had any recurrent known atrial arrhythmias.  He remains on Xarelto and metoprolol.  He also in 2015 had cardiac catheterization that revealed modest coronary artery disease with 50% stenosis of the left anterior descending and a small obtuse marginal branch.  His last echocardiogram in 2015 revealed an ejection fraction of 35% and small patent foramen ovale.  He had a follow-up echocardiogram on 2017 that showed normal systolic function with an ejection fraction of 64%.  No significant valvular heart disease was present.  In 2018 he had a follow-up treadmill exercise stress test that was negative for ischemia at a good workload.    Patient states he is active doing yard work he has had no chest pain, shortness of breath, palpitations, syncope near syncope.  He feels quite well.      Past Medical History:   Diagnosis Date    Atrial flutter     Chest pain     Coronary artery disease     Diabetes mellitus     Fatigue     Hyperlipidemia     Hypertension     Lightheadedness     Nonischemic cardiomyopathy     SOB (shortness of  breath)     Supraventricular tachycardia     Tachycardia      Past Surgical History:   Procedure Laterality Date    ATRIAL ABLATION SURGERY      CARDIAC ABLATION      atrial flutter     Outpatient Medications Prior to Visit   Medication Sig Dispense Refill    Accu-Chek Jayashree Plus test strip       amLODIPine (NORVASC) 10 MG tablet Take 1 tablet by mouth Daily.      aspirin 81 MG EC tablet Take 1 tablet by mouth Daily.      glipizide (GLUCOTROL) 10 MG tablet TAKE 1 TABLET TWICE DAILY BEFORE MEALS      hydroCHLOROthiazide (HYDRODIURIL) 12.5 MG tablet Take 1 tablet by mouth Daily.      lisinopril (PRINIVIL,ZESTRIL) 40 MG tablet Take 1 tablet by mouth Daily.      metoprolol tartrate (LOPRESSOR) 50 MG tablet Take 1 tablet by mouth Every 12 (Twelve) Hours. 180 tablet 3    simvastatin (ZOCOR) 10 MG tablet Take 1 tablet by mouth Daily.      SITagliptin (JANUVIA) 50 MG tablet Take 1 tablet by mouth Daily.      lisinopril (PRINIVIL,ZESTRIL) 20 MG tablet Take 1 tablet by mouth 2 (Two) Times a Day. 180 tablet 3    amLODIPine (NORVASC) 2.5 MG tablet Take 1 tablet by mouth 2 (Two) Times a Day. (Patient not taking: Reported on 8/2/2023) 180 tablet 3     No facility-administered medications prior to visit.       Allergies as of 08/02/2023    (No Known Allergies)     Social History     Socioeconomic History    Marital status:    Tobacco Use    Smoking status: Never    Smokeless tobacco: Never   Substance and Sexual Activity    Alcohol use: No     Comment: No caffeine use    Drug use: No    Sexual activity: Defer     Family History   Problem Relation Age of Onset    Hypertension Mother     Diabetes Other         Unspecified Aunt     Review of Systems   Constitutional: Negative for chills, fever, weight gain and weight loss.   Cardiovascular:  Negative for leg swelling.   Respiratory:  Negative for cough, snoring and wheezing.    Hematologic/Lymphatic: Negative for bleeding problem. Does not bruise/bleed easily.   Skin:   "Negative for color change.   Musculoskeletal:  Negative for falls, joint pain and myalgias.   Gastrointestinal:  Negative for melena.   Genitourinary:  Negative for hematuria.   Neurological:  Negative for excessive daytime sleepiness.   Psychiatric/Behavioral:  Negative for depression. The patient is not nervous/anxious.       Objective:     Vitals:    08/02/23 1314 08/02/23 1318   BP: 138/72 132/76   BP Location: Right arm Left arm   Weight: 77.1 kg (170 lb)    Height: 172.7 cm (68\")      Body mass index is 25.85 kg/mý.    Vitals reviewed.   Constitutional:       Appearance: Well-developed.   Eyes:      General: No scleral icterus.        Right eye: No discharge.      Conjunctiva/sclera: Conjunctivae normal.      Pupils: Pupils are equal, round, and reactive to light.   HENT:      Head: Normocephalic.      Nose: Nose normal.   Neck:      Thyroid: No thyromegaly.      Vascular: No JVD.   Pulmonary:      Effort: Pulmonary effort is normal. No respiratory distress.      Breath sounds: Normal breath sounds. No wheezing. No rales.   Cardiovascular:      Normal rate. Irregularly irregular rhythm. Normal S1. Normal S2.       No gallop.  No click. No rub.   Pulses:     Intact distal pulses.      Carotid: 2+ bilaterally.     Radial: 2+ bilaterally.     Femoral: 2+ bilaterally.     Popliteal: 2+ bilaterally.     Dorsalis pedis: 2+ bilaterally.     Posterior tibial: 2+ bilaterally.  Edema:     Peripheral edema absent.   Abdominal:      General: Bowel sounds are normal. There is no distension.      Palpations: Abdomen is soft.      Tenderness: There is no abdominal tenderness. There is no rebound.   Musculoskeletal: Normal range of motion.         General: No tenderness.      Cervical back: Normal range of motion and neck supple. Skin:     General: Skin is warm and dry.      Findings: No erythema or rash.   Neurological:      Mental Status: Alert and oriented to person, place, and time.   Psychiatric:         Behavior: " Behavior normal.         Thought Content: Thought content normal.         Judgment: Judgment normal.     Lab Review:     Lab Results - Last 18 Months   Lab Units 04/29/22  1015   BUN mg/dL 20   CREATININE mg/dL 1.03   SODIUM mmol/L 138   POTASSIUM mmol/L 4.2   CHLORIDE mmol/L 101   TOTAL CO2 mmol/L 25   CALCIUM mg/dL 9.2   BUN / CREAT RATIO RATIO 19.4     Lab Results - Last 18 Months   Lab Units 04/29/22  1015   BUN mg/dL 20   CREATININE mg/dL 1.03   SODIUM mmol/L 138   POTASSIUM mmol/L 4.2   CHLORIDE mmol/L 101   TOTAL CO2 mmol/L 25   CALCIUM mg/dL 9.2   TOTAL PROTEIN g/dL 6.5   ALBUMIN g/dL 4.4   ALT (SGPT) U/L 15   AST (SGOT) U/L 19   ALK PHOS U/L 52   BILIRUBIN mg/dL 0.6   GLOBULIN g/dL 2.1   BUN / CREAT RATIO RATIO 19.4         ECG 12 Lead    Date/Time: 8/2/2023 1:42 AM  Performed by: Lori Cruz MD  Authorized by: Lori Cruz MD   Comparison: compared with previous ECG   Similar to previous ECG  Rhythm: sinus rhythm      Assessment:       Diagnosis Plan   1. Primary hypertension  ECG 12 Lead      2. SVT (supraventricular tachycardia)  ECG 12 Lead        Plan:       1.  Modest coronary artery disease, clinically stable with negative stress test in August 2018.  Doing well clinically with risk factor modification  2.  Hypertension.  Controlled  3.  Transient cardiomyopathy.  Likely tachycardia mediated.  Resolved  4.  WPW, paroxysmal supra-ventricular tachycardia and atrial flutter, status post ablation x  3  5.  Diabetes  6.  Dyslipidemia.  No recent lipid panel on file.  Remains on statin therapy        I spent 35 minutes on the separately reported service of ECG. This time is not included in the time used to support the E/M service also reported today.        Your medication list            Accurate as of August 2, 2023 11:59 PM. If you have any questions, ask your nurse or doctor.                CONTINUE taking these medications        Instructions Last Dose Given Next Dose Due   Accu-Chek Jayashree Plus test  strip  Generic drug: glucose blood           amLODIPine 10 MG tablet  Commonly known as: NORVASC      Take 1 tablet by mouth Daily.       aspirin 81 MG EC tablet      Take 1 tablet by mouth Daily.       glipizide 10 MG tablet  Commonly known as: GLUCOTROL      TAKE 1 TABLET TWICE DAILY BEFORE MEALS       hydroCHLOROthiazide 12.5 MG tablet  Commonly known as: HYDRODIURIL      Take 1 tablet by mouth Daily.       lisinopril 40 MG tablet  Commonly known as: PRINIVIL,ZESTRIL      Take 1 tablet by mouth Daily.       metoprolol tartrate 50 MG tablet  Commonly known as: LOPRESSOR      Take 1 tablet by mouth Every 12 (Twelve) Hours.       simvastatin 10 MG tablet  Commonly known as: ZOCOR      Take 1 tablet by mouth Daily.       SITagliptin 50 MG tablet  Commonly known as: JANUVIA      Take 1 tablet by mouth Daily.                Patient is no longer taking -.  I corrected the med list to reflect this.  I did not stop these medications.      Dictated utilizing Dragon dictation

## 2024-08-06 NOTE — PROGRESS NOTES
Date of Office Visit: 2024  Encounter Provider: Lori Cruz MD  Place of Service: [facility] CARDIOLOGY  Patient Name: Gómez Franco  :1940    Chief complaint  Followup coronary artery disease, atrial flutter and transient cardiomyopathy    History of Present Illness  The patient is a delightful, 83-year-old gentleman with diabetes, hypertension, and hyperlipidemia who was seen in 2015 with supraventricular tachycardia for which he initially underwent ablation, which was thought to be successful.  At that time, his ejection fraction was 35%.  However, he had recurrent palpitations and tachycardia with flutter for which he was placed on Xarelto and subsequently underwent ablation in 2015.  He then again had recurrent flutter and had a third ablation in 2015.  He subsequently was sent home and has done well and has not had any recurrent known atrial arrhythmias.  He remains on Xarelto and metoprolol.  He also in 2015 had cardiac catheterization that revealed modest coronary artery disease with 50% stenosis of the left anterior descending and a small obtuse marginal branch.  His last echocardiogram in 2015 revealed an ejection fraction of 35% and small patent foramen ovale.  He had a follow-up echocardiogram on 2017 that showed normal systolic function with an ejection fraction of 64%.  No significant valvular heart disease was present.  In 2018 he had a follow-up treadmill exercise stress test that was negative for ischemia at a good workload.    Patient denies any chest pain palpitations syncope or near syncope.  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  Blood pressure at home has been occasionally in the 140s.  He notes that he took Ozempic for 6 weeks lost 10 pounds and felt unwell.  He stopped this on his own.    Past Medical History:   Diagnosis Date    Atrial flutter     Chest pain      Coronary artery disease     Diabetes mellitus     Fatigue     Hyperlipidemia     Hypertension     Lightheadedness     Nonischemic cardiomyopathy     SOB (shortness of breath)     Supraventricular tachycardia     Tachycardia      Past Surgical History:   Procedure Laterality Date    ATRIAL ABLATION SURGERY      CARDIAC ABLATION      atrial flutter     Outpatient Medications Prior to Visit   Medication Sig Dispense Refill    Accu-Chek Jayashree Plus test strip       amLODIPine (NORVASC) 10 MG tablet Take 1 tablet by mouth Daily.      aspirin 81 MG EC tablet Take 1 tablet by mouth Daily.      glipizide (GLUCOTROL) 10 MG tablet TAKE 1 TABLET TWICE DAILY BEFORE MEALS      hydroCHLOROthiazide (HYDRODIURIL) 12.5 MG tablet Take 1 tablet by mouth Daily.      lisinopril (PRINIVIL,ZESTRIL) 40 MG tablet Take 1 tablet by mouth Daily.      metoprolol tartrate (LOPRESSOR) 50 MG tablet Take 1 tablet by mouth Every 12 (Twelve) Hours. 180 tablet 3    simvastatin (ZOCOR) 10 MG tablet Take 1 tablet by mouth Daily.      metoprolol succinate XL (TOPROL-XL) 50 MG 24 hr tablet Take 1 tablet by mouth Daily. (Patient not taking: Reported on 8/7/2024)      SITagliptin (JANUVIA) 50 MG tablet Take 1 tablet by mouth Daily. (Patient not taking: Reported on 8/7/2024)       No facility-administered medications prior to visit.       Allergies as of 08/07/2024    (No Known Allergies)     Social History     Socioeconomic History    Marital status:    Tobacco Use    Smoking status: Never     Passive exposure: Never    Smokeless tobacco: Never   Vaping Use    Vaping status: Never Used   Substance and Sexual Activity    Alcohol use: No     Comment: No caffeine use    Drug use: No    Sexual activity: Defer     Family History   Problem Relation Age of Onset    Hypertension Mother     Diabetes Other         Unspecified Aunt     Review of Systems   Constitutional: Positive for weight loss. Negative for chills, fever and weight gain.   Cardiovascular:   "Negative for leg swelling.   Respiratory:  Negative for cough, snoring and wheezing.    Hematologic/Lymphatic: Negative for bleeding problem. Does not bruise/bleed easily.   Skin:  Negative for color change.   Musculoskeletal:  Negative for falls, joint pain and myalgias.   Gastrointestinal:  Negative for melena.   Genitourinary:  Negative for hematuria.   Neurological:  Negative for excessive daytime sleepiness.   Psychiatric/Behavioral:  Negative for depression. The patient is not nervous/anxious.         Objective:     Vitals:    08/07/24 1226   BP: 120/76   BP Location: Left arm   Patient Position: Sitting   Cuff Size: Adult   Pulse: 92   Resp: 16   Weight: 75.3 kg (166 lb)   Height: 172.7 cm (68\")     Body mass index is 25.24 kg/m².    Vitals reviewed.   Constitutional:       Appearance: Well-developed.   Eyes:      General: No scleral icterus.        Right eye: No discharge.      Conjunctiva/sclera: Conjunctivae normal.      Pupils: Pupils are equal, round, and reactive to light.   HENT:      Head: Normocephalic.      Nose: Nose normal.   Neck:      Thyroid: No thyromegaly.      Vascular: No JVD.   Pulmonary:      Effort: Pulmonary effort is normal. No respiratory distress.      Breath sounds: Normal breath sounds. No wheezing. No rales.   Cardiovascular:      Normal rate. Regular rhythm. Normal S1. Normal S2.       Murmurs: There is no murmur.      No gallop.    Pulses:     Intact distal pulses.      Carotid: 2+ bilaterally.     Radial: 2+ bilaterally.     Femoral: 2+ bilaterally.     Popliteal: 2+ bilaterally.     Dorsalis pedis: 2+ bilaterally.     Posterior tibial: 2+ bilaterally.  Edema:     Peripheral edema absent.   Abdominal:      General: Bowel sounds are normal. There is no distension.      Palpations: Abdomen is soft.      Tenderness: There is no abdominal tenderness. There is no rebound.   Musculoskeletal: Normal range of motion.         General: No tenderness.      Cervical back: Normal range of " motion and neck supple. Skin:     General: Skin is warm and dry.      Findings: No erythema or rash.   Neurological:      Mental Status: Alert and oriented to person, place, and time.   Psychiatric:         Behavior: Behavior normal.         Thought Content: Thought content normal.         Judgment: Judgment normal.       Lab Review:   Lab Results - Last 18 Months   Lab Units 05/29/24  1151   WBC 10*3/uL 5.79   RBC 10*6/uL 5.10   HEMOGLOBIN g/dL 15.3   HEMATOCRIT % 45.6   MCV fL 89.4   MCH pg 30.0   MCHC g/dL 33.6   RDW % 12.5   PLATELETS 10*3/uL 194   NEUTROPHIL % % 51.4   LYMPHOCYTE % % 38.5   MONOCYTES % % 7.3   EOSINOPHIL % % 1.7   BASOPHIL % % 0.9   NEUTROS ABS 10*3/uL 2.98   LYMPHS ABS 10*3/uL 2.23   MONOS ABS 10*3/uL 0.42   EOS ABS 10*3/uL 0.10   BASOS ABS 10*3/uL 0.05   MPV fL 9.7       Lab Results - Last 18 Months   Lab Units 03/02/23  1155   BUN mg/dL 19   CREATININE mg/dL 1.13   SODIUM mmol/L 137   POTASSIUM mmol/L 4.5   CHLORIDE mmol/L 100   TOTAL CO2 mmol/L 26   CALCIUM mg/dL 9.7   TOTAL PROTEIN g/dL 7.3   ALBUMIN g/dL 4.5   ALT (SGPT) U/L 19   AST (SGOT) U/L 29   ALK PHOS U/L 48   BILIRUBIN mg/dL 0.5   GLOBULIN g/dL 2.8   BUN / CREAT RATIO RATIO 16.8   ANION GAP (arb'U) 11         ECG 12 Lead    Date/Time: 8/7/2024 1:39 AM  Performed by: Lori Cruz MD    Authorized by: Lori Cruz MD  Comparison: compared with previous ECG   Similar to previous ECG  Rhythm: sinus rhythm    Clinical impression: normal ECG        Assessment:       Diagnosis Plan   1. Coronary artery disease involving coronary bypass graft of native heart without angina pectoris  Adult Stress Echo W/ Cont or Stress Agent if Necessary Per Protocol    Vascular Screening (Bundle) CAR    ECG 12 Lead      2. Other ill-defined heart diseases  Adult Stress Echo W/ Cont or Stress Agent if Necessary Per Protocol    Vascular Screening (Bundle) CAR    ECG 12 Lead      3. Encounter for screening for vascular disease  Vascular Screening (Bundle)  CAR    ECG 12 Lead      4. Coronary artery disease involving native coronary artery of native heart without angina pectoris        5. Familial hypercholesterolemia          Plan:       1.  Modest coronary artery disease, clinically stable with negative stress test in August 2018.Clinically is doing well but is more sedentary being a caretaker.  It has been quite a while since his last coronary evaluation (since 2018).  I recommended a stress echocardiogram especially diabetes and sedentary state  2.  Hypertension.  Will check additional readings at home and bring his device in for device check.   3.  Transient cardiomyopathy.  Likely tachycardia mediated.  Resolved  4.  WPW, paroxysmal supra-ventricular tachycardia and atrial flutter, status post ablation x  3  5.  Diabetes  6.  Dyslipidemia.  No recent lipid panel on file.  Remains on statin therapy but will take simvastatin at night for more efficacious to improve efficacy.        Time Spent: I spent 35 minutes caring for Gómez on this date of service. This time includes time spent by me in the following activities: preparing for the visit, reviewing tests, obtaining and/or reviewing a separately obtained history, performing a medically appropriate examination and/or evaluation, counseling and educating the patient/family/caregiver, ordering medications, tests, or procedures, documenting information in the medical record, and independently interpreting results and communicating that information with the patient/family/caregiver.   I spent 1 minutes on the separately reported service of ECG. This time is not included in the time used to support the E/M service also reported today.        Your medication list            Accurate as of August 7, 2024 11:59 PM. If you have any questions, ask your nurse or doctor.                CONTINUE taking these medications        Instructions Last Dose Given Next Dose Due   Accu-Chek Jayashree Plus test strip  Generic drug: glucose  blood           amLODIPine 10 MG tablet  Commonly known as: NORVASC      Take 1 tablet by mouth Daily.       aspirin 81 MG EC tablet      Take 1 tablet by mouth Daily.       glipizide 10 MG tablet  Commonly known as: GLUCOTROL      TAKE 1 TABLET TWICE DAILY BEFORE MEALS       hydroCHLOROthiazide 12.5 MG tablet      Take 1 tablet by mouth Daily.       lisinopril 40 MG tablet  Commonly known as: PRINIVIL,ZESTRIL      Take 1 tablet by mouth Daily.       metoprolol succinate XL 50 MG 24 hr tablet  Commonly known as: TOPROL-XL      Take 1 tablet by mouth Daily.       metoprolol tartrate 50 MG tablet  Commonly known as: LOPRESSOR      Take 1 tablet by mouth Every 12 (Twelve) Hours.       simvastatin 10 MG tablet  Commonly known as: ZOCOR      Take 1 tablet by mouth Daily.       SITagliptin 50 MG tablet  Commonly known as: JANUVIA      Take 1 tablet by mouth Daily.                Patient is no longer taking -.  I corrected the med list to reflect this.  I did not stop these medications.      Dictated utilizing Dragon dictation

## 2024-08-07 ENCOUNTER — OFFICE VISIT (OUTPATIENT)
Dept: CARDIOLOGY | Facility: CLINIC | Age: 84
End: 2024-08-07
Payer: MEDICARE

## 2024-08-07 VITALS
WEIGHT: 166 LBS | BODY MASS INDEX: 25.16 KG/M2 | RESPIRATION RATE: 16 BRPM | SYSTOLIC BLOOD PRESSURE: 120 MMHG | HEART RATE: 92 BPM | HEIGHT: 68 IN | DIASTOLIC BLOOD PRESSURE: 76 MMHG

## 2024-08-07 DIAGNOSIS — I51.89 OTHER ILL-DEFINED HEART DISEASES: ICD-10-CM

## 2024-08-07 DIAGNOSIS — E78.01 FAMILIAL HYPERCHOLESTEROLEMIA: ICD-10-CM

## 2024-08-07 DIAGNOSIS — Z13.6 ENCOUNTER FOR SCREENING FOR VASCULAR DISEASE: ICD-10-CM

## 2024-08-07 DIAGNOSIS — I25.810 CORONARY ARTERY DISEASE INVOLVING CORONARY BYPASS GRAFT OF NATIVE HEART WITHOUT ANGINA PECTORIS: Primary | ICD-10-CM

## 2024-08-07 DIAGNOSIS — I25.10 CORONARY ARTERY DISEASE INVOLVING NATIVE CORONARY ARTERY OF NATIVE HEART WITHOUT ANGINA PECTORIS: ICD-10-CM

## 2024-08-07 RX ORDER — METOPROLOL SUCCINATE 50 MG/1
50 TABLET, EXTENDED RELEASE ORAL DAILY
COMMUNITY
Start: 2024-06-26

## 2024-08-16 ENCOUNTER — TELEPHONE (OUTPATIENT)
Dept: CARDIOLOGY | Facility: CLINIC | Age: 84
End: 2024-08-16
Payer: MEDICARE

## 2024-08-16 ENCOUNTER — HOSPITAL ENCOUNTER (OUTPATIENT)
Dept: CARDIOLOGY | Facility: HOSPITAL | Age: 84
Discharge: HOME | End: 2024-08-16

## 2024-08-16 VITALS
WEIGHT: 162 LBS | SYSTOLIC BLOOD PRESSURE: 144 MMHG | HEIGHT: 67 IN | HEART RATE: 78 BPM | DIASTOLIC BLOOD PRESSURE: 79 MMHG | BODY MASS INDEX: 25.43 KG/M2

## 2024-08-16 DIAGNOSIS — I25.810 CORONARY ARTERY DISEASE INVOLVING CORONARY BYPASS GRAFT OF NATIVE HEART WITHOUT ANGINA PECTORIS: ICD-10-CM

## 2024-08-16 DIAGNOSIS — I51.89 OTHER ILL-DEFINED HEART DISEASES: ICD-10-CM

## 2024-08-16 DIAGNOSIS — Z13.6 ENCOUNTER FOR SCREENING FOR VASCULAR DISEASE: ICD-10-CM

## 2024-08-16 LAB
BH CV ECHO MEAS - DIST AO DIAM: 1.47 CM
BH CV VAS BP LEFT ARM: NORMAL MMHG
BH CV VAS BP RIGHT ARM: NORMAL MMHG
BH CV VAS SCREENING CAROTID CCA LEFT: NORMAL CM/SEC
BH CV VAS SCREENING CAROTID CCA RIGHT: NORMAL CM/SEC
BH CV VAS SCREENING CAROTID ICA LEFT: NORMAL CM/SEC
BH CV VAS SCREENING CAROTID ICA RIGHT: NORMAL CM/SEC
BH CV XLRA MEAS - MID AO DIAM: 1.69 CM
BH CV XLRA MEAS - PAD LEFT ABI DP: 1.11
BH CV XLRA MEAS - PAD LEFT ABI PT: 1.07
BH CV XLRA MEAS - PAD LEFT ARM: 144 MMHG
BH CV XLRA MEAS - PAD LEFT LEG DP: 161 MMHG
BH CV XLRA MEAS - PAD LEFT LEG PT: 155 MMHG
BH CV XLRA MEAS - PAD RIGHT ABI DP: 1.04
BH CV XLRA MEAS - PAD RIGHT ABI PT: 1.11
BH CV XLRA MEAS - PAD RIGHT ARM: 138 MMHG
BH CV XLRA MEAS - PAD RIGHT LEG DP: 150 MMHG
BH CV XLRA MEAS - PAD RIGHT LEG PT: 161 MMHG
BH CV XLRA MEAS - PROX AO DIAM: 2.02 CM
BH CV XLRA MEAS LEFT ICA/CCA RATIO: 0.74
BH CV XLRA MEAS LEFT PROX ECA PSV: NORMAL CM/SEC
BH CV XLRA MEAS RIGHT ICA/CCA RATIO: 0.72
BH CV XLRA MEAS RIGHT PROX ECA PSV: NORMAL CM/SEC
MAXIMAL PREDICTED HEART RATE: 139 BPM
STRESS TARGET HR: 118 BPM

## 2024-08-16 PROCEDURE — 93799 UNLISTED CV SVC/PROCEDURE: CPT

## 2024-08-16 NOTE — TELEPHONE ENCOUNTER
Please let him know that vascular screening study showed bilateral carotid plaque with no narrowing of either carotid artery.  Abdominal aorta was normal, and there was no evidence of peripheral arterial disease.  Would recommend good cholesterol control with LDL less than 70, triglycerides less than 150, HDL greater than 50.  Continue aspirin and statin.  Keep currently scheduled follow-up appointment

## 2024-08-16 NOTE — TELEPHONE ENCOUNTER
Results and recommendations called to pt.  Instructed to call with any further questions or concerns.  Verbalized understanding.    Priscilla Macias RN  Triage Nurse, [facility] – [place], [place]  08/16/24 14:41 EDT

## 2024-08-20 ENCOUNTER — HOSPITAL ENCOUNTER (OUTPATIENT)
Dept: CARDIOLOGY | Facility: HOSPITAL | Age: 84
Discharge: HOME | End: 2024-08-20
Admitting: INTERNAL MEDICINE
Payer: MEDICARE

## 2024-08-20 VITALS
WEIGHT: 166 LBS | DIASTOLIC BLOOD PRESSURE: 80 MMHG | HEART RATE: 80 BPM | SYSTOLIC BLOOD PRESSURE: 148 MMHG | BODY MASS INDEX: 25.16 KG/M2 | HEIGHT: 68 IN

## 2024-08-20 DIAGNOSIS — I51.89 OTHER ILL-DEFINED HEART DISEASES: ICD-10-CM

## 2024-08-20 DIAGNOSIS — I25.810 CORONARY ARTERY DISEASE INVOLVING CORONARY BYPASS GRAFT OF NATIVE HEART WITHOUT ANGINA PECTORIS: ICD-10-CM

## 2024-08-20 LAB
BH CV ECHO MEAS - ACS: 2.17 CM
BH CV ECHO MEAS - AO MAX PG: 7.3 MMHG
BH CV ECHO MEAS - AO MEAN PG: 3.6 MMHG
BH CV ECHO MEAS - AO ROOT DIAM: 3.4 CM
BH CV ECHO MEAS - AO V2 MAX: 134.7 CM/SEC
BH CV ECHO MEAS - AO V2 VTI: 29.2 CM
BH CV ECHO MEAS - AVA(I,D): 2.07 CM2
BH CV ECHO MEAS - EDV(CUBED): 117.6 ML
BH CV ECHO MEAS - EDV(MOD-SP2): 98 ML
BH CV ECHO MEAS - EDV(MOD-SP4): 103 ML
BH CV ECHO MEAS - EF(MOD-BP): 62.3 %
BH CV ECHO MEAS - EF(MOD-SP2): 64.3 %
BH CV ECHO MEAS - EF(MOD-SP4): 62.1 %
BH CV ECHO MEAS - ESV(CUBED): 35.4 ML
BH CV ECHO MEAS - ESV(MOD-SP2): 35 ML
BH CV ECHO MEAS - ESV(MOD-SP4): 39 ML
BH CV ECHO MEAS - FS: 33 %
BH CV ECHO MEAS - IVS/LVPW: 1 CM
BH CV ECHO MEAS - IVSD: 1 CM
BH CV ECHO MEAS - LA DIMENSION: 3.2 CM
BH CV ECHO MEAS - LAT PEAK E' VEL: 5.7 CM/SEC
BH CV ECHO MEAS - LV DIASTOLIC VOL/BSA (35-75): 54 CM2
BH CV ECHO MEAS - LV MASS(C)D: 176 GRAMS
BH CV ECHO MEAS - LV MAX PG: 3.3 MMHG
BH CV ECHO MEAS - LV MEAN PG: 1.17 MMHG
BH CV ECHO MEAS - LV SYSTOLIC VOL/BSA (12-30): 20.4 CM2
BH CV ECHO MEAS - LV V1 MAX: 90.7 CM/SEC
BH CV ECHO MEAS - LV V1 VTI: 18.7 CM
BH CV ECHO MEAS - LVIDD: 4.9 CM
BH CV ECHO MEAS - LVIDS: 3.3 CM
BH CV ECHO MEAS - LVOT AREA: 3.2 CM2
BH CV ECHO MEAS - LVOT DIAM: 2.03 CM
BH CV ECHO MEAS - LVPWD: 1 CM
BH CV ECHO MEAS - MED PEAK E' VEL: 6 CM/SEC
BH CV ECHO MEAS - MV A DUR: 0.18 SEC
BH CV ECHO MEAS - MV A MAX VEL: 103.8 CM/SEC
BH CV ECHO MEAS - MV DEC SLOPE: 209.4 CM/SEC2
BH CV ECHO MEAS - MV DEC TIME: 0.33 SEC
BH CV ECHO MEAS - MV E MAX VEL: 61.3 CM/SEC
BH CV ECHO MEAS - MV E/A: 0.59
BH CV ECHO MEAS - MV MAX PG: 7.8 MMHG
BH CV ECHO MEAS - MV MEAN PG: 1.82 MMHG
BH CV ECHO MEAS - MV P1/2T: 110 MSEC
BH CV ECHO MEAS - MV V2 VTI: 26.2 CM
BH CV ECHO MEAS - MVA(P1/2T): 2 CM2
BH CV ECHO MEAS - MVA(VTI): 2.32 CM2
BH CV ECHO MEAS - PA ACC TIME: 0.16 SEC
BH CV ECHO MEAS - PA V2 MAX: 104.3 CM/SEC
BH CV ECHO MEAS - PULM A REVS DUR: 0.12 SEC
BH CV ECHO MEAS - PULM A REVS VEL: 61.8 CM/SEC
BH CV ECHO MEAS - PULM DIAS VEL: 39.2 CM/SEC
BH CV ECHO MEAS - PULM S/D: 1.76
BH CV ECHO MEAS - PULM SYS VEL: 68.9 CM/SEC
BH CV ECHO MEAS - QP/QS: 1.18
BH CV ECHO MEAS - RAP SYSTOLE: 3 MMHG
BH CV ECHO MEAS - RV MAX PG: 1.86 MMHG
BH CV ECHO MEAS - RV V1 MAX: 68.1 CM/SEC
BH CV ECHO MEAS - RV V1 VTI: 16.1 CM
BH CV ECHO MEAS - RVDD: 2.02 CM
BH CV ECHO MEAS - RVOT DIAM: 2.38 CM
BH CV ECHO MEAS - RVSP: 25.9 MMHG
BH CV ECHO MEAS - SV(LVOT): 60.6 ML
BH CV ECHO MEAS - SV(MOD-SP2): 63 ML
BH CV ECHO MEAS - SV(MOD-SP4): 64 ML
BH CV ECHO MEAS - SV(RVOT): 71.5 ML
BH CV ECHO MEAS - SVI(LVOT): 31.8 ML/M2
BH CV ECHO MEAS - SVI(MOD-SP2): 33 ML/M2
BH CV ECHO MEAS - SVI(MOD-SP4): 33.6 ML/M2
BH CV ECHO MEAS - TAPSE (>1.6): 2.17 CM
BH CV ECHO MEAS - TR MAX PG: 22.9 MMHG
BH CV ECHO MEAS - TR MAX VEL: 239 CM/SEC
BH CV ECHO MEASUREMENTS AVERAGE E/E' RATIO: 10.48
BH CV STRESS BP STAGE 1: NORMAL
BH CV STRESS DURATION MIN STAGE 1: 3
BH CV STRESS DURATION MIN STAGE 2: 1
BH CV STRESS DURATION SEC STAGE 1: 0
BH CV STRESS DURATION SEC STAGE 2: 51
BH CV STRESS ECHO POST STRESS EJECTION FRACTION EF: 77 %
BH CV STRESS GRADE STAGE 1: 10
BH CV STRESS GRADE STAGE 2: 12
BH CV STRESS HR STAGE 1: 121
BH CV STRESS HR STAGE 2: 130
BH CV STRESS METS STAGE 1: 5
BH CV STRESS METS STAGE 2: 6.9
BH CV STRESS PROTOCOL 1: NORMAL
BH CV STRESS RECOVERY BP: NORMAL MMHG
BH CV STRESS RECOVERY HR: 135 BPM
BH CV STRESS SPEED STAGE 1: 1.7
BH CV STRESS SPEED STAGE 2: 2.5
BH CV STRESS STAGE 1: 1
BH CV STRESS STAGE 2: 2
BH CV XLRA - RV BASE: 3.3 CM
BH CV XLRA - RV LENGTH: 5.3 CM
BH CV XLRA - RV MID: 3.4 CM
BH CV XLRA - TDI S': 14.3 CM/SEC
LEFT ATRIUM VOLUME INDEX: 12.1 ML/M2
MAXIMAL PREDICTED HEART RATE: 139 BPM
PERCENT MAX PREDICTED HR: 97.84 %
SINUS: 2.8 CM
STJ: 2.46 CM
STRESS BASELINE BP: NORMAL MMHG
STRESS BASELINE HR: 73 BPM
STRESS O2 SAT REST: 98 %
STRESS PERCENT HR: 115 %
STRESS POST ESTIMATED WORKLOAD: 6.9 METS
STRESS POST EXERCISE DUR MIN: 4 MIN
STRESS POST EXERCISE DUR SEC: 52 SEC
STRESS POST PEAK BP: NORMAL MMHG
STRESS POST PEAK HR: 136 BPM
STRESS TARGET HR: 118 BPM

## 2024-08-20 PROCEDURE — 93350 STRESS TTE ONLY: CPT

## 2024-08-20 PROCEDURE — 93325 DOPPLER ECHO COLOR FLOW MAPG: CPT

## 2024-08-20 PROCEDURE — 93320 DOPPLER ECHO COMPLETE: CPT

## 2024-08-20 PROCEDURE — 25510000001 PERFLUTREN 6.52 MG/ML SUSPENSION 2 ML VIAL: Performed by: INTERNAL MEDICINE

## 2024-08-20 PROCEDURE — 93017 CV STRESS TEST TRACING ONLY: CPT

## 2024-08-20 RX ADMIN — SODIUM CHLORIDE 5 ML: 9 INJECTION INTRAMUSCULAR; INTRAVENOUS; SUBCUTANEOUS at 13:17

## 2024-08-22 ENCOUNTER — TELEPHONE (OUTPATIENT)
Dept: CARDIOLOGY | Facility: CLINIC | Age: 84
End: 2024-08-22
Payer: MEDICARE

## 2024-08-22 NOTE — TELEPHONE ENCOUNTER
Results and recommendations called to pt.  Instructed to call with any further questions or concerns.  Verbalized understanding.    Priscilla Macias RN  [place] Nurse, [facility] [place]  08/22/24 14:35 EDT

## 2024-08-22 NOTE — TELEPHONE ENCOUNTER
Please let him know that stress echo did not show evidence of tightly blocked coronary arteries.  His heart is strong and functioning well.  There is a probable PFO that was not seen on prior study however when I discussed this with Dr. Cruz this has likely been present for life.  The right side of his heart is functioning well and he is already on aspirin therapy therefore there is no further evaluation needed for this.  There is a small amount of leakage from his mitral valve but this is not worrisome at this time.  Would continue current medications and keep currently scheduled follow-up appointment

## 2025-03-03 NOTE — PROGRESS NOTES
Date of Office Visit: 2025  Encounter Provider: WILFREDO Saba  Place of Service: [facility] CARDIOLOGY  Patient Name: Gómez Franco  :1940    Chief complaint  Followup coronary artery disease, atrial flutter and transient cardiomyopathy     History of Present Illness  Patient is a 84 y.o. year old male  patient of Dr. Cruz. Past medical history includes diabetes, hypertension, and hyperlipidemia who was seen in 2015 with supraventricular tachycardia for which he initially underwent ablation, which was thought to be successful. At that time, his ejection fraction was 35%. However, he had recurrent palpitations and tachycardia with flutter for which he was placed on Xarelto and subsequently underwent ablation in 2015. He then again had recurrent flutter and had a third ablation in 2015. He subsequently was sent home and has done well and has not had any recurrent known atrial arrhythmias. He remains on Xarelto and metoprolol. He also in 2015 had cardiac catheterization that revealed modest coronary artery disease with 50% stenosis of the left anterior descending and a small obtuse marginal branch. His last echocardiogram in 2015 revealed an ejection fraction of 35% and small patent foramen ovale. He had a follow-up echocardiogram on 2017 that showed normal systolic function with an ejection fraction of 64%. No significant valvular heart disease was present. In 2018 he had a follow-up treadmill exercise stress test that was negative for ischemia at a good workload. He notes that he took Ozempic for 6 weeks lost 10 pounds and felt unwell. He stopped this on his own. Stress echocardiogram 2024 was negative for ischemia.  Vascular screening study showed bilateral carotid plaque with no stenosis, normal abdominal aorta and no evidence of peripheral arterial disease.    Interval history  Patient presents today for routine follow-up.   I will visit with him today and have reviewed his medical record.  Since last visit he has been doing well.  He denies palpitations, shortness of breath, edema, dizziness, chest pain or chest pressure, fatigue, syncope or presyncope.  Blood pressure in office today is slightly elevated as well as heart rate but he reports readings at home are better and usually less than 130/80 on average with HR 60-80. He forgot to take his medications before visit today.  He has been under increased stress at home due to daughter-in-law's recent cancer diagnosis as well as caring for his ill wife.    Past Medical History:   Diagnosis Date    Atrial flutter     Chest pain     Coronary artery disease     Diabetes mellitus     Fatigue     Hyperlipidemia     Hypertension     Lightheadedness     Nonischemic cardiomyopathy     SOB (shortness of breath)     Supraventricular tachycardia     Tachycardia      Past Surgical History:   Procedure Laterality Date    ATRIAL ABLATION SURGERY      CARDIAC ABLATION      atrial flutter     Outpatient Medications Prior to Visit   Medication Sig Dispense Refill    Accu-Chek Jayashree Plus test strip       amLODIPine (NORVASC) 10 MG tablet Take 1 tablet by mouth Daily.      aspirin 81 MG EC tablet Take 1 tablet by mouth Daily.      glipizide (GLUCOTROL) 10 MG tablet TAKE 1 TABLET TWICE DAILY BEFORE MEALS (Patient taking differently: Takes 2 in the am takes 2 in pm)      hydroCHLOROthiazide (HYDRODIURIL) 12.5 MG tablet Take 1 tablet by mouth Daily.      lisinopril (PRINIVIL,ZESTRIL) 40 MG tablet Take 1 tablet by mouth Daily.      metoprolol succinate XL (TOPROL-XL) 50 MG 24 hr tablet Take 1 tablet by mouth Daily.      simvastatin (ZOCOR) 10 MG tablet Take 1 tablet by mouth Daily.      SITagliptin (JANUVIA) 50 MG tablet Take 1 tablet by mouth Daily.      metoprolol tartrate (LOPRESSOR) 50 MG tablet Take 1 tablet by mouth Every 12 (Twelve) Hours. 180 tablet 3     No facility-administered medications prior to  "visit.       Allergies as of 03/04/2025    (No Known Allergies)     Social History     Socioeconomic History    Marital status:    Tobacco Use    Smoking status: Never     Passive exposure: Never    Smokeless tobacco: Never   Vaping Use    Vaping status: Never Used   Substance and Sexual Activity    Alcohol use: No     Comment: No caffeine use    Drug use: No    Sexual activity: Defer     Family History   Problem Relation Age of Onset    Hypertension Mother     Diabetes Other         Unspecified Aunt     Review of Systems   Constitutional: Negative for malaise/fatigue.   Cardiovascular:  Negative for chest pain, claudication, dyspnea on exertion, leg swelling, near-syncope, orthopnea, palpitations, paroxysmal nocturnal dyspnea and syncope.   Respiratory:  Negative for shortness of breath.    Neurological:  Negative for brief paralysis, dizziness, headaches and light-headedness.   All other systems reviewed and are negative.       Objective:     Vitals:    03/04/25 1045   BP: 138/80   Pulse: 110   Resp: 18   SpO2: 98%   Weight: 73.9 kg (163 lb)   Height: 172.7 cm (68\")     Body mass index is 24.78 kg/m².    Vitals reviewed.   Constitutional:       General: Not in acute distress.     Appearance: Well-developed and not in distress. Not diaphoretic.   HENT:      Head: Normocephalic.   Pulmonary:      Effort: Pulmonary effort is normal. No respiratory distress.      Breath sounds: Normal breath sounds. No wheezing. No rhonchi. No rales.   Cardiovascular:      Tachycardia present. Regular rhythm.      Murmurs: There is no murmur.   Pulses:     Radial: 2+ bilaterally.  Edema:     Peripheral edema absent.   Skin:     General: Skin is warm and dry. There is no cyanosis.      Findings: No rash.   Neurological:      Mental Status: Alert and oriented to person, place, and time.   Psychiatric:         Behavior: Behavior normal. Behavior is cooperative.         Thought Content: Thought content normal.         Judgment: " "Judgment normal.       Lab Review:     Lab Results   Component Value Date     03/02/2023     04/29/2022    K 4.5 03/02/2023    K 4.2 04/29/2022     03/02/2023     04/29/2022    CO2 26 03/02/2023    CO2 25 04/29/2022    BUN 19 03/02/2023    BUN 20 04/29/2022    CREATININE 1.13 03/02/2023    CREATININE 1.03 04/29/2022    EGFRIFNONA 64 04/19/2016    EGFRIFAFRI >60 03/02/2023    GLUCOSE 193 (H) 04/19/2016    GLUCOSE 163 (H) 07/16/2015    CALCIUM 9.7 03/02/2023    CALCIUM 9.2 04/29/2022    ALBUMIN 4.5 03/02/2023    ALBUMIN 4.4 04/29/2022    BILITOT 0.5 03/02/2023    BILITOT 0.6 04/29/2022    AST 29 03/02/2023    AST 19 04/29/2022    ALT 19 03/02/2023    ALT 15 04/29/2022     Lab Results   Component Value Date    WBC 5.79 05/29/2024    WBC 7.02 04/26/2021    HGB 15.3 05/29/2024    HGB 15.6 04/26/2021    HCT 45.6 05/29/2024    HCT 47.8 04/26/2021    MCV 89.4 05/29/2024    MCV 93.2 04/26/2021     05/29/2024     04/26/2021     No results found for: \"PROBNP\", \"BNP\"  Lab Results   Component Value Date    TROPONINT <0.01 03/15/2015     Lab Results   Component Value Date    TSH 1.410 11/25/2019    TSH 3.41 03/27/2015             ECG 12 Lead    Date/Time: 3/4/2025 11:25 AM  Performed by: Lizz Mora APRN    Authorized by: Lizz Mora APRN  Comparison: compared with previous ECG   Similar to previous ECG  Rhythm: sinus tachycardia  Rate: normal  BPM: 110  Q waves: II, III and aVF    QRS axis: normal  Comments: Similar to prior though rate has increased        Assessment:       Diagnosis Plan   1. Coronary artery disease involving native coronary artery of native heart without angina pectoris        2. Familial hypercholesterolemia        3. Primary hypertension        4. SVT (supraventricular tachycardia)          Plan:       1.  Modest coronary artery disease, clinically stable with negative stress test in August 2018.Clinically is doing well but is more sedentary being a " caretaker.  It has been quite a while since his last coronary evaluation (since 2018). Stress echo in August 2024 negative for ischemia.   2.  Hypertension.  Readings at home are usually less than 130/80 on average.  Heart rate is also better at home and usually 60 to 80 bpm.  He forgot to take medications prior to visit today.  I asked him to monitor heart rate and blood pressure at home and send in readings in 1 to 2 weeks so that medications could be adjusted if needed.  3.  Transient cardiomyopathy.  Likely tachycardia mediated.  Resolved on most recent echocardiogram  4.  WPW, paroxysmal supra-ventricular tachycardia and atrial flutter, status post ablation x  3.  Sinus tachycardia on EKG today likely due to missed metoprolol dose.  Will continue to monitor.  5.  Diabetes  6.  Dyslipidemia.  Managed by PCP (in [facility]).  On statin    Time Spent: I spent 30 minutes caring for Gómez on this date of service. This time includes time spent by me in the following activities: preparing for the visit, reviewing tests, performing a medically appropriate examination and/or evaluations, counseling and educating the patient/family/caregiver, ordering medications, tests, or procedures, documenting information in the medical record, and independently interpreting results and communicating that information with the patient/family/caregiver.   I spent 1 minutes on the separately reported service of ECG. This time is not included in the time used to support the E/M service also reported today.        Your medication list            Accurate as of March 4, 2025 11:28 AM. If you have any questions, ask your nurse or doctor.                CHANGE how you take these medications        Instructions Last Dose Given Next Dose Due   glipizide 10 MG tablet  Commonly known as: GLUCOTROL  What changed:   how much to take  how to take this  when to take this  additional instructions      TAKE 1 TABLET TWICE DAILY BEFORE MEALS               CONTINUE taking these medications        Instructions Last Dose Given Next Dose Due   Accu-Chek Jayashree Plus test strip  Generic drug: glucose blood           amLODIPine 10 MG tablet  Commonly known as: NORVASC      Take 1 tablet by mouth Daily.       aspirin 81 MG EC tablet      Take 1 tablet by mouth Daily.       hydroCHLOROthiazide 12.5 MG tablet      Take 1 tablet by mouth Daily.       lisinopril 40 MG tablet  Commonly known as: PRINIVIL,ZESTRIL      Take 1 tablet by mouth Daily.       metoprolol succinate XL 50 MG 24 hr tablet  Commonly known as: TOPROL-XL      Take 1 tablet by mouth Daily.       simvastatin 10 MG tablet  Commonly known as: ZOCOR      Take 1 tablet by mouth Daily.       SITagliptin 50 MG tablet  Commonly known as: JANUVIA      Take 1 tablet by mouth Daily.                Patient is no longer taking -.  I corrected the med list to reflect this.  I did not stop these medications.    Return in about 6 months (around 9/4/2025) for with Dr. Cruz.      Dictated utilizing Dragon dictation

## 2025-03-04 ENCOUNTER — OFFICE VISIT (OUTPATIENT)
Dept: CARDIOLOGY | Facility: CLINIC | Age: 85
End: 2025-03-04
Payer: MEDICARE

## 2025-03-04 VITALS
HEART RATE: 110 BPM | OXYGEN SATURATION: 98 % | DIASTOLIC BLOOD PRESSURE: 80 MMHG | SYSTOLIC BLOOD PRESSURE: 138 MMHG | WEIGHT: 163 LBS | RESPIRATION RATE: 18 BRPM | HEIGHT: 68 IN | BODY MASS INDEX: 24.71 KG/M2

## 2025-03-04 DIAGNOSIS — E78.01 FAMILIAL HYPERCHOLESTEROLEMIA: ICD-10-CM

## 2025-03-04 DIAGNOSIS — I47.10 SVT (SUPRAVENTRICULAR TACHYCARDIA): ICD-10-CM

## 2025-03-04 DIAGNOSIS — I25.10 CORONARY ARTERY DISEASE INVOLVING NATIVE CORONARY ARTERY OF NATIVE HEART WITHOUT ANGINA PECTORIS: Primary | ICD-10-CM

## 2025-03-04 DIAGNOSIS — I10 PRIMARY HYPERTENSION: ICD-10-CM

## 2025-03-04 PROCEDURE — 3079F DIAST BP 80-89 MM HG: CPT | Performed by: NURSE PRACTITIONER

## 2025-03-04 PROCEDURE — 3075F SYST BP GE 130 - 139MM HG: CPT | Performed by: NURSE PRACTITIONER

## 2025-03-04 PROCEDURE — 99214 OFFICE O/P EST MOD 30 MIN: CPT | Performed by: NURSE PRACTITIONER

## 2025-03-04 PROCEDURE — 93000 ELECTROCARDIOGRAM COMPLETE: CPT | Performed by: NURSE PRACTITIONER
